# Patient Record
Sex: FEMALE | Race: WHITE | NOT HISPANIC OR LATINO | Employment: OTHER | URBAN - METROPOLITAN AREA
[De-identification: names, ages, dates, MRNs, and addresses within clinical notes are randomized per-mention and may not be internally consistent; named-entity substitution may affect disease eponyms.]

---

## 2021-10-27 ENCOUNTER — LAB REQUISITION (OUTPATIENT)
Dept: LAB | Facility: CLINIC | Age: 86
End: 2021-10-27
Payer: COMMERCIAL

## 2021-10-27 DIAGNOSIS — I10 ESSENTIAL (PRIMARY) HYPERTENSION: ICD-10-CM

## 2021-11-04 LAB
ANION GAP SERPL CALCULATED.3IONS-SCNC: 8 MMOL/L (ref 5–18)
BUN SERPL-MCNC: 13 MG/DL (ref 8–28)
CALCIUM SERPL-MCNC: 8.6 MG/DL (ref 8.5–10.5)
CHLORIDE BLD-SCNC: 109 MMOL/L (ref 98–107)
CO2 SERPL-SCNC: 28 MMOL/L (ref 22–31)
CREAT SERPL-MCNC: 0.64 MG/DL (ref 0.6–1.1)
GFR SERPL CREATININE-BSD FRML MDRD: 80 ML/MIN/1.73M2
GLUCOSE BLD-MCNC: 79 MG/DL (ref 70–125)
POTASSIUM BLD-SCNC: 3.5 MMOL/L (ref 3.5–5)
SODIUM SERPL-SCNC: 145 MMOL/L (ref 136–145)

## 2021-11-04 PROCEDURE — 80048 BASIC METABOLIC PNL TOTAL CA: CPT | Mod: ORL | Performed by: PHYSICIAN ASSISTANT

## 2021-11-04 PROCEDURE — P9604 ONE-WAY ALLOW PRORATED TRIP: HCPCS | Mod: ORL | Performed by: PHYSICIAN ASSISTANT

## 2021-11-04 PROCEDURE — 36415 COLL VENOUS BLD VENIPUNCTURE: CPT | Mod: ORL | Performed by: PHYSICIAN ASSISTANT

## 2021-11-10 ENCOUNTER — LAB REQUISITION (OUTPATIENT)
Dept: LAB | Facility: CLINIC | Age: 86
End: 2021-11-10
Payer: COMMERCIAL

## 2021-11-10 DIAGNOSIS — E87.6 HYPOKALEMIA: ICD-10-CM

## 2021-11-18 LAB — POTASSIUM BLD-SCNC: 4.1 MMOL/L (ref 3.5–5)

## 2021-11-18 PROCEDURE — 84132 ASSAY OF SERUM POTASSIUM: CPT | Mod: ORL | Performed by: PHYSICIAN ASSISTANT

## 2021-11-18 PROCEDURE — 36415 COLL VENOUS BLD VENIPUNCTURE: CPT | Mod: ORL | Performed by: PHYSICIAN ASSISTANT

## 2021-11-18 PROCEDURE — P9603 ONE-WAY ALLOW PRORATED MILES: HCPCS | Mod: ORL | Performed by: PHYSICIAN ASSISTANT

## 2022-01-18 ENCOUNTER — LAB REQUISITION (OUTPATIENT)
Dept: LAB | Facility: CLINIC | Age: 87
End: 2022-01-18
Payer: COMMERCIAL

## 2022-01-18 DIAGNOSIS — I10 ESSENTIAL (PRIMARY) HYPERTENSION: ICD-10-CM

## 2022-01-19 ENCOUNTER — LAB REQUISITION (OUTPATIENT)
Dept: LAB | Facility: CLINIC | Age: 87
End: 2022-01-19

## 2022-01-19 DIAGNOSIS — I10 ESSENTIAL (PRIMARY) HYPERTENSION: ICD-10-CM

## 2022-01-27 LAB
ANION GAP SERPL CALCULATED.3IONS-SCNC: 8 MMOL/L (ref 5–18)
BUN SERPL-MCNC: 18 MG/DL (ref 8–28)
CALCIUM SERPL-MCNC: 9.1 MG/DL (ref 8.5–10.5)
CHLORIDE BLD-SCNC: 108 MMOL/L (ref 98–107)
CO2 SERPL-SCNC: 26 MMOL/L (ref 22–31)
CREAT SERPL-MCNC: 0.71 MG/DL (ref 0.6–1.1)
GFR SERPL CREATININE-BSD FRML MDRD: 81 ML/MIN/1.73M2
GLUCOSE BLD-MCNC: 95 MG/DL (ref 70–125)
POTASSIUM BLD-SCNC: 4.6 MMOL/L (ref 3.5–5)
SODIUM SERPL-SCNC: 142 MMOL/L (ref 136–145)

## 2022-01-27 PROCEDURE — P9604 ONE-WAY ALLOW PRORATED TRIP: HCPCS | Mod: ORL | Performed by: PHYSICIAN ASSISTANT

## 2022-01-27 PROCEDURE — 80048 BASIC METABOLIC PNL TOTAL CA: CPT | Mod: ORL | Performed by: PHYSICIAN ASSISTANT

## 2022-01-27 PROCEDURE — 36415 COLL VENOUS BLD VENIPUNCTURE: CPT | Mod: ORL | Performed by: PHYSICIAN ASSISTANT

## 2022-07-13 ENCOUNTER — LAB REQUISITION (OUTPATIENT)
Dept: LAB | Facility: CLINIC | Age: 87
End: 2022-07-13
Payer: COMMERCIAL

## 2022-07-13 DIAGNOSIS — K59.1 FUNCTIONAL DIARRHEA: ICD-10-CM

## 2022-07-14 LAB
ALBUMIN SERPL BCG-MCNC: 3.9 G/DL (ref 3.5–5.2)
ALP SERPL-CCNC: 69 U/L (ref 35–104)
ALT SERPL W P-5'-P-CCNC: 13 U/L (ref 10–35)
AST SERPL W P-5'-P-CCNC: 25 U/L (ref 10–35)
BILIRUB DIRECT SERPL-MCNC: <0.2 MG/DL (ref 0–0.3)
BILIRUB SERPL-MCNC: 0.3 MG/DL
PROT SERPL-MCNC: 6.8 G/DL (ref 6.4–8.3)
VALPROATE SERPL-MCNC: 8.6 UG/ML

## 2022-07-14 PROCEDURE — 36415 COLL VENOUS BLD VENIPUNCTURE: CPT | Mod: ORL | Performed by: PHYSICIAN ASSISTANT

## 2022-07-14 PROCEDURE — P9604 ONE-WAY ALLOW PRORATED TRIP: HCPCS | Mod: ORL | Performed by: PHYSICIAN ASSISTANT

## 2022-07-14 PROCEDURE — 80164 ASSAY DIPROPYLACETIC ACD TOT: CPT | Mod: ORL | Performed by: PHYSICIAN ASSISTANT

## 2022-07-14 PROCEDURE — 80076 HEPATIC FUNCTION PANEL: CPT | Mod: ORL | Performed by: PHYSICIAN ASSISTANT

## 2022-07-20 ENCOUNTER — LAB REQUISITION (OUTPATIENT)
Dept: LAB | Facility: CLINIC | Age: 87
End: 2022-07-20
Payer: COMMERCIAL

## 2022-07-20 DIAGNOSIS — K59.1 FUNCTIONAL DIARRHEA: ICD-10-CM

## 2022-10-31 ENCOUNTER — LAB REQUISITION (OUTPATIENT)
Dept: LAB | Facility: CLINIC | Age: 87
End: 2022-10-31
Payer: COMMERCIAL

## 2022-10-31 DIAGNOSIS — F41.8 OTHER SPECIFIED ANXIETY DISORDERS: ICD-10-CM

## 2022-11-09 ENCOUNTER — LAB REQUISITION (OUTPATIENT)
Dept: LAB | Facility: CLINIC | Age: 87
End: 2022-11-09
Payer: COMMERCIAL

## 2022-11-09 DIAGNOSIS — F41.8 OTHER SPECIFIED ANXIETY DISORDERS: ICD-10-CM

## 2022-11-10 LAB
ANION GAP SERPL CALCULATED.3IONS-SCNC: 13 MMOL/L (ref 7–15)
BUN SERPL-MCNC: 26.6 MG/DL (ref 8–23)
CALCIUM SERPL-MCNC: 9.8 MG/DL (ref 8.8–10.2)
CHLORIDE SERPL-SCNC: 108 MMOL/L (ref 98–107)
CREAT SERPL-MCNC: 0.74 MG/DL (ref 0.51–0.95)
DEPRECATED HCO3 PLAS-SCNC: 24 MMOL/L (ref 22–29)
GFR SERPL CREATININE-BSD FRML MDRD: 77 ML/MIN/1.73M2
GLUCOSE SERPL-MCNC: 84 MG/DL (ref 70–99)
POTASSIUM SERPL-SCNC: 5 MMOL/L (ref 3.4–5.3)
SODIUM SERPL-SCNC: 145 MMOL/L (ref 136–145)
VALPROATE SERPL-MCNC: 19.7 UG/ML

## 2022-11-10 PROCEDURE — P9603 ONE-WAY ALLOW PRORATED MILES: HCPCS | Mod: ORL | Performed by: PHYSICIAN ASSISTANT

## 2022-11-10 PROCEDURE — 36415 COLL VENOUS BLD VENIPUNCTURE: CPT | Mod: ORL | Performed by: PHYSICIAN ASSISTANT

## 2022-11-10 PROCEDURE — 80164 ASSAY DIPROPYLACETIC ACD TOT: CPT | Mod: ORL | Performed by: PHYSICIAN ASSISTANT

## 2022-11-10 PROCEDURE — 80048 BASIC METABOLIC PNL TOTAL CA: CPT | Mod: ORL | Performed by: PHYSICIAN ASSISTANT

## 2023-01-01 ENCOUNTER — HOSPITAL ENCOUNTER (OUTPATIENT)
Facility: HOSPITAL | Age: 88
Setting detail: OBSERVATION
Discharge: HOME | End: 2023-12-28
Attending: FAMILY MEDICINE | Admitting: INTERNAL MEDICINE
Payer: COMMERCIAL

## 2023-01-01 ENCOUNTER — MEDICAL CORRESPONDENCE (OUTPATIENT)
Dept: HEALTH INFORMATION MANAGEMENT | Facility: CLINIC | Age: 88
End: 2023-01-01
Payer: COMMERCIAL

## 2023-01-01 ENCOUNTER — LAB REQUISITION (OUTPATIENT)
Dept: LAB | Facility: CLINIC | Age: 88
End: 2023-01-01
Payer: COMMERCIAL

## 2023-01-01 ENCOUNTER — APPOINTMENT (OUTPATIENT)
Dept: OCCUPATIONAL THERAPY | Facility: HOSPITAL | Age: 88
End: 2023-01-01
Attending: INTERNAL MEDICINE
Payer: COMMERCIAL

## 2023-01-01 ENCOUNTER — APPOINTMENT (OUTPATIENT)
Dept: CT IMAGING | Facility: HOSPITAL | Age: 88
End: 2023-01-01
Attending: FAMILY MEDICINE
Payer: COMMERCIAL

## 2023-01-01 ENCOUNTER — APPOINTMENT (OUTPATIENT)
Dept: PHYSICAL THERAPY | Facility: HOSPITAL | Age: 88
End: 2023-01-01
Attending: INTERNAL MEDICINE
Payer: COMMERCIAL

## 2023-01-01 VITALS
SYSTOLIC BLOOD PRESSURE: 140 MMHG | HEIGHT: 64 IN | RESPIRATION RATE: 20 BRPM | WEIGHT: 135.14 LBS | HEART RATE: 78 BPM | BODY MASS INDEX: 23.07 KG/M2 | DIASTOLIC BLOOD PRESSURE: 63 MMHG | OXYGEN SATURATION: 97 % | TEMPERATURE: 98.2 F

## 2023-01-01 DIAGNOSIS — I10 ESSENTIAL (PRIMARY) HYPERTENSION: ICD-10-CM

## 2023-01-01 DIAGNOSIS — K21.00 GASTROESOPHAGEAL REFLUX DISEASE WITH ESOPHAGITIS WITHOUT HEMORRHAGE: Primary | ICD-10-CM

## 2023-01-01 DIAGNOSIS — F03.911 DEMENTIA WITH AGITATION, UNSPECIFIED DEMENTIA SEVERITY, UNSPECIFIED DEMENTIA TYPE (H): ICD-10-CM

## 2023-01-01 DIAGNOSIS — G93.40 ACUTE ENCEPHALOPATHY: ICD-10-CM

## 2023-01-01 DIAGNOSIS — F02.80 DEMENTIA IN OTHER DISEASES CLASSIFIED ELSEWHERE, UNSPECIFIED SEVERITY, WITHOUT BEHAVIORAL DISTURBANCE, PSYCHOTIC DISTURBANCE, MOOD DISTURBANCE, AND ANXIETY (H): ICD-10-CM

## 2023-01-01 DIAGNOSIS — K59.01 SLOW TRANSIT CONSTIPATION: ICD-10-CM

## 2023-01-01 DIAGNOSIS — N39.0 ACUTE LOWER UTI: ICD-10-CM

## 2023-01-01 LAB
ALBUMIN SERPL BCG-MCNC: 3.8 G/DL (ref 3.5–5.2)
ALBUMIN UR-MCNC: 20 MG/DL
ALP SERPL-CCNC: 87 U/L (ref 40–150)
ALT SERPL W P-5'-P-CCNC: 21 U/L (ref 0–50)
AMMONIA PLAS-SCNC: 15 UMOL/L (ref 11–51)
ANION GAP SERPL CALCULATED.3IONS-SCNC: 10 MMOL/L (ref 7–15)
ANION GAP SERPL CALCULATED.3IONS-SCNC: 14 MMOL/L (ref 7–15)
APPEARANCE UR: ABNORMAL
AST SERPL W P-5'-P-CCNC: 26 U/L (ref 0–45)
ATRIAL RATE - MUSE: 77 BPM
BACTERIA BLD CULT: NO GROWTH
BACTERIA BLD CULT: NO GROWTH
BACTERIA UR CULT: NO GROWTH
BASE EXCESS BLDV CALC-SCNC: 1.8 MMOL/L
BASOPHILS # BLD AUTO: 0.1 10E3/UL (ref 0–0.2)
BASOPHILS NFR BLD AUTO: 1 %
BILIRUB DIRECT SERPL-MCNC: <0.2 MG/DL (ref 0–0.3)
BILIRUB SERPL-MCNC: 0.3 MG/DL
BILIRUB UR QL STRIP: NEGATIVE
BUN SERPL-MCNC: 10.4 MG/DL (ref 8–23)
BUN SERPL-MCNC: 26.5 MG/DL (ref 8–23)
CALCIUM SERPL-MCNC: 9.1 MG/DL (ref 8.2–9.6)
CALCIUM SERPL-MCNC: 9.2 MG/DL (ref 8.2–9.6)
CHLORIDE SERPL-SCNC: 105 MMOL/L (ref 98–107)
CHLORIDE SERPL-SCNC: 106 MMOL/L (ref 98–107)
COLOR UR AUTO: YELLOW
CREAT SERPL-MCNC: 0.64 MG/DL (ref 0.51–0.95)
CREAT SERPL-MCNC: 0.73 MG/DL (ref 0.51–0.95)
DEPRECATED HCO3 PLAS-SCNC: 23 MMOL/L (ref 22–29)
DEPRECATED HCO3 PLAS-SCNC: 26 MMOL/L (ref 22–29)
DIASTOLIC BLOOD PRESSURE - MUSE: 74 MMHG
DIGOXIN SERPL-MCNC: <0.4 NG/ML (ref 0.6–2)
EGFRCR SERPLBLD CKD-EPI 2021: 78 ML/MIN/1.73M2
EGFRCR SERPLBLD CKD-EPI 2021: 83 ML/MIN/1.73M2
EOSINOPHIL # BLD AUTO: 0.1 10E3/UL (ref 0–0.7)
EOSINOPHIL NFR BLD AUTO: 1 %
ERYTHROCYTE [DISTWIDTH] IN BLOOD BY AUTOMATED COUNT: 12.7 % (ref 10–15)
FLUAV RNA SPEC QL NAA+PROBE: NEGATIVE
FLUBV RNA RESP QL NAA+PROBE: NEGATIVE
GLUCOSE BLDC GLUCOMTR-MCNC: 104 MG/DL (ref 70–99)
GLUCOSE BLDC GLUCOMTR-MCNC: 115 MG/DL (ref 70–99)
GLUCOSE BLDC GLUCOMTR-MCNC: 150 MG/DL (ref 70–99)
GLUCOSE SERPL-MCNC: 110 MG/DL (ref 70–99)
GLUCOSE SERPL-MCNC: 166 MG/DL (ref 70–99)
GLUCOSE UR STRIP-MCNC: NEGATIVE MG/DL
HBA1C MFR BLD: 5.4 %
HCO3 BLDV-SCNC: 27 MMOL/L (ref 24–30)
HCT VFR BLD AUTO: 37.3 % (ref 35–47)
HGB BLD-MCNC: 12.2 G/DL (ref 11.7–15.7)
HGB UR QL STRIP: ABNORMAL
HOLD SPECIMEN: NORMAL
IMM GRANULOCYTES # BLD: 0 10E3/UL
IMM GRANULOCYTES NFR BLD: 1 %
INTERPRETATION ECG - MUSE: NORMAL
KETONES UR STRIP-MCNC: ABNORMAL MG/DL
LACTATE SERPL-SCNC: 1.6 MMOL/L (ref 0.7–2)
LEUKOCYTE ESTERASE UR QL STRIP: ABNORMAL
LYMPHOCYTES # BLD AUTO: 1.2 10E3/UL (ref 0.8–5.3)
LYMPHOCYTES NFR BLD AUTO: 15 %
MAGNESIUM SERPL-MCNC: 2 MG/DL (ref 1.7–2.3)
MCH RBC QN AUTO: 32 PG (ref 26.5–33)
MCHC RBC AUTO-ENTMCNC: 32.7 G/DL (ref 31.5–36.5)
MCV RBC AUTO: 98 FL (ref 78–100)
MONOCYTES # BLD AUTO: 0.5 10E3/UL (ref 0–1.3)
MONOCYTES NFR BLD AUTO: 6 %
MUCOUS THREADS #/AREA URNS LPF: PRESENT /LPF
NEUTROPHILS # BLD AUTO: 6 10E3/UL (ref 1.6–8.3)
NEUTROPHILS NFR BLD AUTO: 76 %
NITRATE UR QL: NEGATIVE
NRBC # BLD AUTO: 0 10E3/UL
NRBC BLD AUTO-RTO: 0 /100
OXYHGB MFR BLDV: 70.2 % (ref 70–75)
P AXIS - MUSE: 63 DEGREES
PCO2 BLDV: 49 MM HG (ref 35–50)
PH BLDV: 7.35 [PH] (ref 7.35–7.45)
PH UR STRIP: 6.5 [PH] (ref 5–7)
PLATELET # BLD AUTO: 242 10E3/UL (ref 150–450)
PO2 BLDV: 42 MM HG (ref 25–47)
POTASSIUM SERPL-SCNC: 3.9 MMOL/L (ref 3.4–5.3)
POTASSIUM SERPL-SCNC: 3.9 MMOL/L (ref 3.4–5.3)
PR INTERVAL - MUSE: 192 MS
PROCALCITONIN SERPL IA-MCNC: 0.12 NG/ML
PROT SERPL-MCNC: 6.3 G/DL (ref 6.4–8.3)
QRS DURATION - MUSE: 80 MS
QT - MUSE: 360 MS
QTC - MUSE: 407 MS
R AXIS - MUSE: 19 DEGREES
RBC # BLD AUTO: 3.81 10E6/UL (ref 3.8–5.2)
RBC URINE: 18 /HPF
RSV RNA SPEC NAA+PROBE: NEGATIVE
SAO2 % BLDV: 71.4 % (ref 70–75)
SARS-COV-2 RNA RESP QL NAA+PROBE: NEGATIVE
SODIUM SERPL-SCNC: 142 MMOL/L (ref 135–145)
SODIUM SERPL-SCNC: 142 MMOL/L (ref 135–145)
SP GR UR STRIP: 1.02 (ref 1–1.03)
SYSTOLIC BLOOD PRESSURE - MUSE: 169 MMHG
T AXIS - MUSE: 11 DEGREES
T4 FREE SERPL-MCNC: 1.16 NG/DL (ref 0.9–1.7)
TSH SERPL DL<=0.005 MIU/L-ACNC: 5.58 UIU/ML (ref 0.3–4.2)
UROBILINOGEN UR STRIP-MCNC: <2 MG/DL
VALPROATE FREE MFR SERPL: ABNORMAL %
VALPROATE FREE SERPL-MCNC: <7 UG/ML
VALPROATE SERPL-MCNC: 23 UG/ML
VENTRICULAR RATE- MUSE: 77 BPM
WBC # BLD AUTO: 7.9 10E3/UL (ref 4–11)
WBC CLUMPS #/AREA URNS HPF: PRESENT /HPF
WBC URINE: >182 /HPF

## 2023-01-01 PROCEDURE — 84443 ASSAY THYROID STIM HORMONE: CPT | Performed by: INTERNAL MEDICINE

## 2023-01-01 PROCEDURE — 84439 ASSAY OF FREE THYROXINE: CPT | Performed by: INTERNAL MEDICINE

## 2023-01-01 PROCEDURE — 96366 THER/PROPH/DIAG IV INF ADDON: CPT

## 2023-01-01 PROCEDURE — 36415 COLL VENOUS BLD VENIPUNCTURE: CPT | Performed by: INTERNAL MEDICINE

## 2023-01-01 PROCEDURE — G0378 HOSPITAL OBSERVATION PER HR: HCPCS

## 2023-01-01 PROCEDURE — 99222 1ST HOSP IP/OBS MODERATE 55: CPT | Performed by: INTERNAL MEDICINE

## 2023-01-01 PROCEDURE — 70450 CT HEAD/BRAIN W/O DYE: CPT

## 2023-01-01 PROCEDURE — 250N000011 HC RX IP 250 OP 636: Performed by: INTERNAL MEDICINE

## 2023-01-01 PROCEDURE — 258N000003 HC RX IP 258 OP 636: Performed by: INTERNAL MEDICINE

## 2023-01-01 PROCEDURE — 83735 ASSAY OF MAGNESIUM: CPT | Performed by: FAMILY MEDICINE

## 2023-01-01 PROCEDURE — 80162 ASSAY OF DIGOXIN TOTAL: CPT | Mod: ORL | Performed by: PHYSICIAN ASSISTANT

## 2023-01-01 PROCEDURE — 99285 EMERGENCY DEPT VISIT HI MDM: CPT | Mod: 25

## 2023-01-01 PROCEDURE — C9113 INJ PANTOPRAZOLE SODIUM, VIA: HCPCS | Performed by: INTERNAL MEDICINE

## 2023-01-01 PROCEDURE — 97162 PT EVAL MOD COMPLEX 30 MIN: CPT | Mod: GP

## 2023-01-01 PROCEDURE — 82962 GLUCOSE BLOOD TEST: CPT

## 2023-01-01 PROCEDURE — 97110 THERAPEUTIC EXERCISES: CPT | Mod: GP

## 2023-01-01 PROCEDURE — 250N000013 HC RX MED GY IP 250 OP 250 PS 637: Performed by: INTERNAL MEDICINE

## 2023-01-01 PROCEDURE — 71260 CT THORAX DX C+: CPT

## 2023-01-01 PROCEDURE — 99239 HOSP IP/OBS DSCHRG MGMT >30: CPT | Performed by: INTERNAL MEDICINE

## 2023-01-01 PROCEDURE — 99232 SBSQ HOSP IP/OBS MODERATE 35: CPT | Performed by: INTERNAL MEDICINE

## 2023-01-01 PROCEDURE — 258N000003 HC RX IP 258 OP 636: Performed by: STUDENT IN AN ORGANIZED HEALTH CARE EDUCATION/TRAINING PROGRAM

## 2023-01-01 PROCEDURE — 36415 COLL VENOUS BLD VENIPUNCTURE: CPT | Performed by: FAMILY MEDICINE

## 2023-01-01 PROCEDURE — P9604 ONE-WAY ALLOW PRORATED TRIP: HCPCS | Mod: ORL | Performed by: PHYSICIAN ASSISTANT

## 2023-01-01 PROCEDURE — 250N000011 HC RX IP 250 OP 636: Performed by: STUDENT IN AN ORGANIZED HEALTH CARE EDUCATION/TRAINING PROGRAM

## 2023-01-01 PROCEDURE — 93005 ELECTROCARDIOGRAM TRACING: CPT | Performed by: FAMILY MEDICINE

## 2023-01-01 PROCEDURE — 80164 ASSAY DIPROPYLACETIC ACD TOT: CPT | Performed by: INTERNAL MEDICINE

## 2023-01-01 PROCEDURE — 96365 THER/PROPH/DIAG IV INF INIT: CPT

## 2023-01-01 PROCEDURE — 250N000011 HC RX IP 250 OP 636: Mod: JZ | Performed by: FAMILY MEDICINE

## 2023-01-01 PROCEDURE — 36415 COLL VENOUS BLD VENIPUNCTURE: CPT | Mod: ORL | Performed by: PHYSICIAN ASSISTANT

## 2023-01-01 PROCEDURE — 80048 BASIC METABOLIC PNL TOTAL CA: CPT | Mod: ORL | Performed by: PHYSICIAN ASSISTANT

## 2023-01-01 PROCEDURE — 82140 ASSAY OF AMMONIA: CPT | Performed by: FAMILY MEDICINE

## 2023-01-01 PROCEDURE — 96361 HYDRATE IV INFUSION ADD-ON: CPT

## 2023-01-01 PROCEDURE — 87086 URINE CULTURE/COLONY COUNT: CPT | Performed by: FAMILY MEDICINE

## 2023-01-01 PROCEDURE — 83036 HEMOGLOBIN GLYCOSYLATED A1C: CPT | Performed by: INTERNAL MEDICINE

## 2023-01-01 PROCEDURE — 97165 OT EVAL LOW COMPLEX 30 MIN: CPT | Mod: GO

## 2023-01-01 PROCEDURE — 87637 SARSCOV2&INF A&B&RSV AMP PRB: CPT | Performed by: INTERNAL MEDICINE

## 2023-01-01 PROCEDURE — 80053 COMPREHEN METABOLIC PANEL: CPT | Performed by: FAMILY MEDICINE

## 2023-01-01 PROCEDURE — 83605 ASSAY OF LACTIC ACID: CPT | Performed by: FAMILY MEDICINE

## 2023-01-01 PROCEDURE — 80165 DIPROPYLACETIC ACID FREE: CPT | Performed by: INTERNAL MEDICINE

## 2023-01-01 PROCEDURE — 87040 BLOOD CULTURE FOR BACTERIA: CPT | Performed by: FAMILY MEDICINE

## 2023-01-01 PROCEDURE — 96376 TX/PRO/DX INJ SAME DRUG ADON: CPT

## 2023-01-01 PROCEDURE — 81001 URINALYSIS AUTO W/SCOPE: CPT | Performed by: FAMILY MEDICINE

## 2023-01-01 PROCEDURE — 84145 PROCALCITONIN (PCT): CPT | Performed by: FAMILY MEDICINE

## 2023-01-01 PROCEDURE — 82805 BLOOD GASES W/O2 SATURATION: CPT | Performed by: FAMILY MEDICINE

## 2023-01-01 PROCEDURE — 96375 TX/PRO/DX INJ NEW DRUG ADDON: CPT

## 2023-01-01 PROCEDURE — 85025 COMPLETE CBC W/AUTO DIFF WBC: CPT | Performed by: FAMILY MEDICINE

## 2023-01-01 PROCEDURE — 250N000011 HC RX IP 250 OP 636: Performed by: FAMILY MEDICINE

## 2023-01-01 RX ORDER — LISINOPRIL 5 MG/1
10 TABLET ORAL 2 TIMES DAILY
Status: DISCONTINUED | OUTPATIENT
Start: 2023-01-01 | End: 2023-01-01 | Stop reason: HOSPADM

## 2023-01-01 RX ORDER — IOPAMIDOL 755 MG/ML
75 INJECTION, SOLUTION INTRAVASCULAR ONCE
Status: COMPLETED | OUTPATIENT
Start: 2023-01-01 | End: 2023-01-01

## 2023-01-01 RX ORDER — DOCUSATE SODIUM 100 MG/1
100 CAPSULE, LIQUID FILLED ORAL 2 TIMES DAILY PRN
Status: DISCONTINUED | OUTPATIENT
Start: 2023-01-01 | End: 2023-01-01 | Stop reason: HOSPADM

## 2023-01-01 RX ORDER — SODIUM CHLORIDE 9 MG/ML
INJECTION, SOLUTION INTRAVENOUS CONTINUOUS
Status: ACTIVE | OUTPATIENT
Start: 2023-01-01 | End: 2023-01-01

## 2023-01-01 RX ORDER — AMOXICILLIN 250 MG
1 CAPSULE ORAL 2 TIMES DAILY PRN
Status: DISCONTINUED | OUTPATIENT
Start: 2023-01-01 | End: 2023-01-01 | Stop reason: HOSPADM

## 2023-01-01 RX ORDER — DIVALPROEX SODIUM 125 MG/1
250 TABLET, DELAYED RELEASE ORAL EVERY EVENING
COMMUNITY
End: 2023-01-01

## 2023-01-01 RX ORDER — CEFTRIAXONE 1 G/1
1 INJECTION, POWDER, FOR SOLUTION INTRAMUSCULAR; INTRAVENOUS EVERY 24 HOURS
Status: DISCONTINUED | OUTPATIENT
Start: 2023-01-01 | End: 2023-01-01

## 2023-01-01 RX ORDER — CEFTRIAXONE 1 G/1
1 INJECTION, POWDER, FOR SOLUTION INTRAMUSCULAR; INTRAVENOUS ONCE
Status: COMPLETED | OUTPATIENT
Start: 2023-01-01 | End: 2023-01-01

## 2023-01-01 RX ORDER — ACETAMINOPHEN 500 MG
1000 TABLET ORAL DAILY PRN
Status: DISCONTINUED | OUTPATIENT
Start: 2023-01-01 | End: 2023-01-01

## 2023-01-01 RX ORDER — POLYETHYLENE GLYCOL 3350 17 G/17G
17 POWDER, FOR SOLUTION ORAL DAILY PRN
Status: DISCONTINUED | OUTPATIENT
Start: 2023-01-01 | End: 2023-01-01 | Stop reason: HOSPADM

## 2023-01-01 RX ORDER — POLYETHYLENE GLYCOL 3350 17 G/17G
17 POWDER, FOR SOLUTION ORAL DAILY PRN
DISCHARGE
Start: 2023-01-01

## 2023-01-01 RX ORDER — ONDANSETRON 2 MG/ML
4 INJECTION INTRAMUSCULAR; INTRAVENOUS
Status: COMPLETED | OUTPATIENT
Start: 2023-01-01 | End: 2023-01-01

## 2023-01-01 RX ORDER — ACETAMINOPHEN 325 MG/1
650 TABLET ORAL EVERY 4 HOURS PRN
Status: DISCONTINUED | OUTPATIENT
Start: 2023-01-01 | End: 2023-01-01 | Stop reason: HOSPADM

## 2023-01-01 RX ORDER — ACETAMINOPHEN 500 MG
1000 TABLET ORAL DAILY PRN
COMMUNITY

## 2023-01-01 RX ORDER — ACETAMINOPHEN 500 MG
1000 TABLET ORAL 2 TIMES DAILY
COMMUNITY

## 2023-01-01 RX ORDER — MIRTAZAPINE 15 MG/1
15 TABLET, FILM COATED ORAL AT BEDTIME
COMMUNITY

## 2023-01-01 RX ORDER — ACETAMINOPHEN 650 MG/1
650 SUPPOSITORY RECTAL EVERY 4 HOURS PRN
Status: DISCONTINUED | OUTPATIENT
Start: 2023-01-01 | End: 2023-01-01 | Stop reason: HOSPADM

## 2023-01-01 RX ORDER — ASPIRIN 81 MG/1
81 TABLET, CHEWABLE ORAL DAILY
COMMUNITY

## 2023-01-01 RX ORDER — DIVALPROEX SODIUM 125 MG/1
250 CAPSULE, COATED PELLETS ORAL EVERY EVENING
COMMUNITY

## 2023-01-01 RX ORDER — ONDANSETRON 4 MG/1
4 TABLET, ORALLY DISINTEGRATING ORAL EVERY 6 HOURS PRN
Status: DISCONTINUED | OUTPATIENT
Start: 2023-01-01 | End: 2023-01-01 | Stop reason: HOSPADM

## 2023-01-01 RX ORDER — POLYETHYLENE GLYCOL 3350 17 G/17G
17 POWDER, FOR SOLUTION ORAL DAILY
Status: DISCONTINUED | OUTPATIENT
Start: 2023-01-01 | End: 2023-01-01

## 2023-01-01 RX ORDER — DIVALPROEX SODIUM 125 MG/1
125 CAPSULE, COATED PELLETS ORAL EVERY MORNING
Status: DISCONTINUED | OUTPATIENT
Start: 2023-01-01 | End: 2023-01-01 | Stop reason: HOSPADM

## 2023-01-01 RX ORDER — MIRTAZAPINE 15 MG/1
15 TABLET, FILM COATED ORAL AT BEDTIME
Status: DISCONTINUED | OUTPATIENT
Start: 2023-01-01 | End: 2023-01-01 | Stop reason: HOSPADM

## 2023-01-01 RX ORDER — DOCUSATE SODIUM 100 MG/1
100 CAPSULE, LIQUID FILLED ORAL 2 TIMES DAILY PRN
COMMUNITY

## 2023-01-01 RX ORDER — ONDANSETRON 2 MG/ML
4 INJECTION INTRAMUSCULAR; INTRAVENOUS EVERY 6 HOURS PRN
Status: DISCONTINUED | OUTPATIENT
Start: 2023-01-01 | End: 2023-01-01 | Stop reason: HOSPADM

## 2023-01-01 RX ORDER — AMOXICILLIN 250 MG
2 CAPSULE ORAL 2 TIMES DAILY PRN
Status: DISCONTINUED | OUTPATIENT
Start: 2023-01-01 | End: 2023-01-01 | Stop reason: HOSPADM

## 2023-01-01 RX ORDER — ASPIRIN 81 MG/1
81 TABLET, CHEWABLE ORAL DAILY
Status: DISCONTINUED | OUTPATIENT
Start: 2023-01-01 | End: 2023-01-01 | Stop reason: HOSPADM

## 2023-01-01 RX ORDER — DIVALPROEX SODIUM 125 MG/1
250 CAPSULE, COATED PELLETS ORAL EVERY EVENING
Status: DISCONTINUED | OUTPATIENT
Start: 2023-01-01 | End: 2023-01-01 | Stop reason: HOSPADM

## 2023-01-01 RX ORDER — DIVALPROEX SODIUM 125 MG/1
125 CAPSULE, COATED PELLETS ORAL EVERY MORNING
COMMUNITY

## 2023-01-01 RX ORDER — LISINOPRIL 10 MG/1
10 TABLET ORAL 2 TIMES DAILY
COMMUNITY

## 2023-01-01 RX ORDER — ACETAMINOPHEN 500 MG
1000 TABLET ORAL 2 TIMES DAILY
Status: DISCONTINUED | OUTPATIENT
Start: 2023-01-01 | End: 2023-01-01 | Stop reason: HOSPADM

## 2023-01-01 RX ORDER — OMEPRAZOLE 20 MG/1
20 TABLET, DELAYED RELEASE ORAL DAILY
COMMUNITY
Start: 2023-01-01

## 2023-01-01 RX ORDER — POLYETHYLENE GLYCOL 3350 17 G/17G
1 POWDER, FOR SOLUTION ORAL DAILY
Status: ON HOLD | COMMUNITY
End: 2023-01-01

## 2023-01-01 RX ADMIN — LISINOPRIL 10 MG: 5 TABLET ORAL at 08:08

## 2023-01-01 RX ADMIN — DIVALPROEX SODIUM 125 MG: 125 CAPSULE, COATED PELLETS ORAL at 10:26

## 2023-01-01 RX ADMIN — ASPIRIN 81 MG: 81 TABLET, CHEWABLE ORAL at 10:27

## 2023-01-01 RX ADMIN — CEFTRIAXONE SODIUM 1 G: 1 INJECTION, POWDER, FOR SOLUTION INTRAMUSCULAR; INTRAVENOUS at 14:42

## 2023-01-01 RX ADMIN — SODIUM CHLORIDE: 9 INJECTION, SOLUTION INTRAVENOUS at 17:04

## 2023-01-01 RX ADMIN — PANTOPRAZOLE SODIUM 40 MG: 40 INJECTION, POWDER, FOR SOLUTION INTRAVENOUS at 20:04

## 2023-01-01 RX ADMIN — PANTOPRAZOLE SODIUM 40 MG: 40 INJECTION, POWDER, FOR SOLUTION INTRAVENOUS at 20:53

## 2023-01-01 RX ADMIN — IOPAMIDOL 75 ML: 755 INJECTION, SOLUTION INTRAVENOUS at 14:24

## 2023-01-01 RX ADMIN — DIVALPROEX SODIUM 125 MG: 125 CAPSULE, COATED PELLETS ORAL at 08:04

## 2023-01-01 RX ADMIN — ONDANSETRON 4 MG: 2 INJECTION INTRAMUSCULAR; INTRAVENOUS at 12:32

## 2023-01-01 RX ADMIN — ACETAMINOPHEN 1000 MG: 500 TABLET ORAL at 10:22

## 2023-01-01 RX ADMIN — ACETAMINOPHEN 1000 MG: 500 TABLET ORAL at 08:04

## 2023-01-01 RX ADMIN — DIVALPROEX SODIUM 250 MG: 125 CAPSULE, COATED PELLETS ORAL at 20:46

## 2023-01-01 RX ADMIN — LISINOPRIL 10 MG: 5 TABLET ORAL at 20:46

## 2023-01-01 RX ADMIN — SODIUM CHLORIDE 500 ML: 9 INJECTION, SOLUTION INTRAVENOUS at 12:06

## 2023-01-01 RX ADMIN — ASPIRIN 81 MG: 81 TABLET, CHEWABLE ORAL at 08:05

## 2023-01-01 RX ADMIN — MIRTAZAPINE 15 MG: 15 TABLET, FILM COATED ORAL at 22:03

## 2023-01-01 RX ADMIN — CEFTRIAXONE SODIUM 1 G: 1 INJECTION, POWDER, FOR SOLUTION INTRAMUSCULAR; INTRAVENOUS at 13:41

## 2023-01-01 RX ADMIN — ACETAMINOPHEN 1000 MG: 500 TABLET ORAL at 20:46

## 2023-01-01 RX ADMIN — LISINOPRIL 10 MG: 5 TABLET ORAL at 10:27

## 2023-01-01 RX ADMIN — PANTOPRAZOLE SODIUM 40 MG: 40 INJECTION, POWDER, FOR SOLUTION INTRAVENOUS at 08:05

## 2023-01-01 ASSESSMENT — ACTIVITIES OF DAILY LIVING (ADL)
ADLS_ACUITY_SCORE: 51
ADLS_ACUITY_SCORE: 37
ADLS_ACUITY_SCORE: 37
ADLS_ACUITY_SCORE: 35
ADLS_ACUITY_SCORE: 47
ADLS_ACUITY_SCORE: 51
ADLS_ACUITY_SCORE: 55
ADLS_ACUITY_SCORE: 47
ADLS_ACUITY_SCORE: 51
DEPENDENT_IADLS:: CLEANING;COOKING;LAUNDRY;SHOPPING;MEAL PREPARATION;MEDICATION MANAGEMENT;MONEY MANAGEMENT;TRANSPORTATION;INCONTINENCE
ADLS_ACUITY_SCORE: 55
ADLS_ACUITY_SCORE: 47
ADLS_ACUITY_SCORE: 35
ADLS_ACUITY_SCORE: 37
ADLS_ACUITY_SCORE: 47
ADLS_ACUITY_SCORE: 51
ADLS_ACUITY_SCORE: 57
ADLS_ACUITY_SCORE: 51
ADLS_ACUITY_SCORE: 35
ADLS_ACUITY_SCORE: 47
ADLS_ACUITY_SCORE: 60
ADLS_ACUITY_SCORE: 51

## 2023-01-01 ASSESSMENT — COLUMBIA-SUICIDE SEVERITY RATING SCALE - C-SSRS
1. IN THE PAST MONTH, HAVE YOU WISHED YOU WERE DEAD OR WISHED YOU COULD GO TO SLEEP AND NOT WAKE UP?: NO
6. HAVE YOU EVER DONE ANYTHING, STARTED TO DO ANYTHING, OR PREPARED TO DO ANYTHING TO END YOUR LIFE?: NO
2. HAVE YOU ACTUALLY HAD ANY THOUGHTS OF KILLING YOURSELF IN THE PAST MONTH?: NO

## 2023-02-07 ENCOUNTER — LAB REQUISITION (OUTPATIENT)
Dept: LAB | Facility: CLINIC | Age: 88
End: 2023-02-07
Payer: COMMERCIAL

## 2023-02-07 DIAGNOSIS — F02.80 DEMENTIA IN OTHER DISEASES CLASSIFIED ELSEWHERE, UNSPECIFIED SEVERITY, WITHOUT BEHAVIORAL DISTURBANCE, PSYCHOTIC DISTURBANCE, MOOD DISTURBANCE, AND ANXIETY (H): ICD-10-CM

## 2023-02-09 LAB
ALBUMIN SERPL BCG-MCNC: 4.1 G/DL (ref 3.5–5.2)
ALP SERPL-CCNC: 73 U/L (ref 35–104)
ALT SERPL W P-5'-P-CCNC: 9 U/L (ref 10–35)
AST SERPL W P-5'-P-CCNC: 26 U/L (ref 10–35)
BILIRUB DIRECT SERPL-MCNC: <0.2 MG/DL (ref 0–0.3)
BILIRUB SERPL-MCNC: 0.3 MG/DL
PROT SERPL-MCNC: 6.8 G/DL (ref 6.4–8.3)

## 2023-02-09 PROCEDURE — 36415 COLL VENOUS BLD VENIPUNCTURE: CPT | Mod: ORL | Performed by: PHYSICIAN ASSISTANT

## 2023-02-09 PROCEDURE — P9604 ONE-WAY ALLOW PRORATED TRIP: HCPCS | Mod: ORL | Performed by: PHYSICIAN ASSISTANT

## 2023-02-09 PROCEDURE — 80076 HEPATIC FUNCTION PANEL: CPT | Mod: ORL | Performed by: PHYSICIAN ASSISTANT

## 2023-09-01 ENCOUNTER — APPOINTMENT (OUTPATIENT)
Dept: CT IMAGING | Facility: HOSPITAL | Age: 88
End: 2023-09-01
Attending: EMERGENCY MEDICINE
Payer: COMMERCIAL

## 2023-09-01 ENCOUNTER — HOSPITAL ENCOUNTER (EMERGENCY)
Facility: HOSPITAL | Age: 88
Discharge: HOME | End: 2023-09-01
Attending: EMERGENCY MEDICINE | Admitting: EMERGENCY MEDICINE
Payer: COMMERCIAL

## 2023-09-01 VITALS
TEMPERATURE: 97.7 F | RESPIRATION RATE: 18 BRPM | OXYGEN SATURATION: 94 % | SYSTOLIC BLOOD PRESSURE: 153 MMHG | DIASTOLIC BLOOD PRESSURE: 72 MMHG | HEART RATE: 79 BPM
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
## 2023-09-01 PROCEDURE — 99284 EMERGENCY DEPT VISIT MOD MDM: CPT | Mod: 25

## 2023-09-01 PROCEDURE — G1010 CDSM STANSON: HCPCS

## 2023-09-01 ASSESSMENT — ACTIVITIES OF DAILY LIVING (ADL)
ADLS_ACUITY_SCORE: 35
ADLS_ACUITY_SCORE: 35
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
## 2023-09-01 NOTE — DISCHARGE INSTRUCTIONS
No injuries were discovered and we did do a CT scan of the head and cervical spine which are negative.  All blood pressures have been normal here and patient is otherwise asymptomatic.
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
## 2023-09-01 NOTE — ED NOTES
Bed: JNED-24  Expected date: 9/1/23  Expected time: 1:07 PM  Means of arrival:   Comments:  Fall/[facility]   No

## 2023-12-26 PROBLEM — N39.0 ACUTE LOWER UTI: Status: ACTIVE | Noted: 2023-01-01

## 2023-12-26 PROBLEM — F03.911 DEMENTIA WITH AGITATION, UNSPECIFIED DEMENTIA SEVERITY, UNSPECIFIED DEMENTIA TYPE (H): Status: ACTIVE | Noted: 2023-01-01

## 2023-12-26 NOTE — ED PROVIDER NOTES
EMERGENCY DEPARTMENT ENCOUNTER      NAME: Risa Mejía  AGE: 89 year old female  YOB: 1933  MRN: [id number]  EVALUATION DATE & TIME: 12/26/2023 11:27 AM    PCP: Nimo Terry    ED PROVIDER: Kwadwo Carpenter M.D.    Chief Complaint   Patient presents with    Altered Mental Status       FINAL IMPRESSION:  1. Acute lower UTI    2. Dementia with agitation, unspecified dementia severity, unspecified dementia type (H)    3. Acute encephalopathy        ED COURSE & MEDICAL DECISION MAKING:    Pertinent Labs & Imaging studies independently interpreted by me. (See chart for details)  12:05 PM Patient seen and examined, reviewed most recent urgency department evaluation September 2023 when patient was seen with agitation and confusion.  On exam here, patient is sleeping, does not open eyes to voice, does withdraw to pain.  No respiratory distress, no hypoxia, no labored breathing.  Patient is noted to be hypothermic on initial exam, labs are ordered along with CT scan for a broad differential for altered mental status in this 89-year-old with dementia.  2:04 PM labs ordered and independently interpreted by me with normal basic metabolic panel, normal hepatic panel, negative lactate, normal procalcitonin, normal white blood cell count, normal venous blood gas.  Urinalysis is consistent with infection which may be causing symptoms today.  No evidence for sepsis.  No prior urine culture in our system, Rocephin IV is ordered.  2:39 PM CT scan of the head independently interpreted by me negative for acute findings.  CT scan of the chest independently interpreted by me negative for acute infiltrate. Given vomiting and altered mentation, patient is at risk for aspiration and this should be monitored closely  2:42 PM Care discussed with Dr. Dubon for admission.    At the conclusion of the encounter I discussed the results of all of the tests and the disposition. The questions were answered. The patient or  family acknowledged understanding and was agreeable with the care plan.     Medical Decision Making    History:  Supplemental history from: EMS and Other: nurse  External Record(s) reviewed: Documented in chart, if applicable.    Work Up:  Chart documentation includes differential considered and any EKGs or imaging independently interpreted by provider, where specified.  In additional to work up documented, I considered the following work up: Documented in chart, if applicable.    External consultation:  Discussion of management with another provider: Documented in chart, if applicable    Complicating factors:  Care impacted by chronic illness: Dementia  Care affected by social determinants of health: N/A    Disposition considerations: Admit.    EKG:    Performed at: 12:11 PM  Impression: Nonspecific ST changes, occasional PVC  Rate: 78  Rhythm: Sinus  Axis: Normal  NJ Interval: 206  QRS Interval: 88  QTc Interval: 458  ST Changes: No acute ischemic changes  Comparison: None    I have independently reviewed and interpreted the EKG(s) documented above.    PROCEDURES:       MEDICATIONS GIVEN IN THE EMERGENCY:  Medications   cefTRIAXone (ROCEPHIN) 1 g vial to attach to  mL bag for ADULTS or NS 50 mL bag for PEDS (1 g Intravenous $New Bag 12/26/23 4200)   senna-docusate (SENOKOT-S/PERICOLACE) 8.6-50 MG per tablet 1 tablet (has no administration in time range)     Or   senna-docusate (SENOKOT-S/PERICOLACE) 8.6-50 MG per tablet 2 tablet (has no administration in time range)   ondansetron (ZOFRAN ODT) ODT tab 4 mg (has no administration in time range)     Or   ondansetron (ZOFRAN) injection 4 mg (has no administration in time range)   acetaminophen (TYLENOL) tablet 650 mg (has no administration in time range)     Or   acetaminophen (TYLENOL) Suppository 650 mg (has no administration in time range)   pantoprazole (PROTONIX) IV push injection 40 mg (has no administration in time range)   ondansetron (ZOFRAN) injection  4 mg (4 mg Intravenous $Given 12/26/23 1232)   sodium chloride 0.9% BOLUS 500 mL (0 mLs Intravenous Stopped 12/26/23 1446)   iopamidol (ISOVUE-370) solution 75 mL (75 mLs Intravenous $Given 12/26/23 1424)       NEW PRESCRIPTIONS STARTED AT TODAY'S ER VISIT  New Prescriptions    No medications on file       =================================================================    HPI    Patient information was obtained from: EMS, nurse      Risa Mejía is a 89 year old female with a pertinent history of dementia who presents to this ED by EMS from [facility] for evaluation of AMS.  Per report, patient usually is ambulatory, confused at baseline but decreased responsiveness today.  Per nurse, large brown emesis and large bowel movement on arrival.      REVIEW OF SYSTEMS   Review of Systems   All other systems reviewed and negative    PAST MEDICAL HISTORY:  No past medical history on file.    PAST SURGICAL HISTORY:  No past surgical history on file.    CURRENT MEDICATIONS:    Current Facility-Administered Medications   Medication    acetaminophen (TYLENOL) tablet 650 mg    Or    acetaminophen (TYLENOL) Suppository 650 mg    cefTRIAXone (ROCEPHIN) 1 g vial to attach to  mL bag for ADULTS or NS 50 mL bag for PEDS    ondansetron (ZOFRAN ODT) ODT tab 4 mg    Or    ondansetron (ZOFRAN) injection 4 mg    pantoprazole (PROTONIX) IV push injection 40 mg    senna-docusate (SENOKOT-S/PERICOLACE) 8.6-50 MG per tablet 1 tablet    Or    senna-docusate (SENOKOT-S/PERICOLACE) 8.6-50 MG per tablet 2 tablet     Current Outpatient Medications   Medication    acetaminophen (TYLENOL) 500 MG tablet    acetaminophen (TYLENOL) 500 MG tablet    aspirin (ASA) 81 MG chewable tablet    divalproex sodium delayed-release (DEPAKOTE SPRINKLE) 125 MG DR capsule    divalproex sodium delayed-release (DEPAKOTE SPRINKLE) 125 MG DR capsule    docusate sodium (COLACE) 100 MG capsule    lisinopril (ZESTRIL) 10 MG tablet    mirtazapine (REMERON) 15  MG tablet    polyethylene glycol (MIRALAX) 17 g packet       ALLERGIES:  Allergies   Allergen Reactions    Oxycodone Muscle Pain (Myalgia)       FAMILY HISTORY:  No family history on file.    SOCIAL HISTORY:   Social History     Socioeconomic History    Marital status:        VITALS:  /60   Pulse 77   Temp 97.5  F (36.4  C) (Rectal)   Resp 25   SpO2 94%     PHYSICAL EXAM:  Physical Exam  Vitals and nursing note reviewed.   Constitutional:       Appearance: Normal appearance.   HENT:      Head: Normocephalic and atraumatic.      Right Ear: External ear normal.      Left Ear: External ear normal.      Nose: Nose normal.      Mouth/Throat:      Mouth: Mucous membranes are moist.   Eyes:      Extraocular Movements: Extraocular movements intact.      Conjunctiva/sclera: Conjunctivae normal.      Pupils: Pupils are equal, round, and reactive to light.   Cardiovascular:      Rate and Rhythm: Normal rate and regular rhythm.   Pulmonary:      Effort: Pulmonary effort is normal.      Breath sounds: Normal breath sounds. No wheezing or rales.   Abdominal:      General: Abdomen is flat. There is no distension.      Palpations: Abdomen is soft.      Tenderness: There is no abdominal tenderness. There is no guarding.   Musculoskeletal:         General: Normal range of motion.      Cervical back: Normal range of motion and neck supple.      Right lower leg: No edema.      Left lower leg: No edema.   Lymphadenopathy:      Cervical: No cervical adenopathy.   Skin:     General: Skin is warm and dry.   Neurological:      General: No focal deficit present.      Mental Status: She is alert.      Comments: No gross focal neurologic deficits.  Does not follow commands, does not open eyes to voice.   Psychiatric:         Mood and Affect: Mood normal.         Behavior: Behavior normal.         Thought Content: Thought content normal.          LAB:  All pertinent labs reviewed and interpreted.  Results for orders placed or  performed during the hospital encounter of 12/26/23   Head CT w/o contrast    Impression    IMPRESSION:  1.  No acute findings. Chronic changes are stable.   CT Chest/Abdomen/Pelvis w Contrast    Impression    IMPRESSION:  1.  Bilateral lower lobar predominant bronchiolitis. Associated endobronchial debris may reflect inflammatory debris or aspiration. No focal pneumonic consolidation or pleural effusion.  2.  Mildly dilated fluid-filled lower thoracic esophagus with long segment circumferential wall thickening which is favored to reflect severe esophagitis rather than a neoplastic process. If the patient is not a candidate for endoscopy, recommend at   least short interval chest CT and/or esophagram follow-up.  3.  No acute findings the abdomen or pelvis. No inflammatory process, bowel obstruction, hydronephrosis or abscess.   Glucose by meter   Result Value Ref Range    GLUCOSE BY METER POCT 150 (H) 70 - 99 mg/dL   Extra Blue Top Tube   Result Value Ref Range    Hold Specimen JIC    Extra Red Top Tube   Result Value Ref Range    Hold Specimen JIC    Extra Green Top (Lithium Heparin) Tube   Result Value Ref Range    Hold Specimen JIC    Basic metabolic panel   Result Value Ref Range    Sodium 142 135 - 145 mmol/L    Potassium 3.9 3.4 - 5.3 mmol/L    Chloride 105 98 - 107 mmol/L    Carbon Dioxide (CO2) 23 22 - 29 mmol/L    Anion Gap 14 7 - 15 mmol/L    Urea Nitrogen 26.5 (H) 8.0 - 23.0 mg/dL    Creatinine 0.73 0.51 - 0.95 mg/dL    GFR Estimate 78 >60 mL/min/1.73m2    Calcium 9.2 8.2 - 9.6 mg/dL    Glucose 166 (H) 70 - 99 mg/dL   Hepatic function panel   Result Value Ref Range    Protein Total 6.3 (L) 6.4 - 8.3 g/dL    Albumin 3.8 3.5 - 5.2 g/dL    Bilirubin Total 0.3 <=1.2 mg/dL    Alkaline Phosphatase 87 40 - 150 U/L    AST 26 0 - 45 U/L    ALT 21 0 - 50 U/L    Bilirubin Direct <0.20 0.00 - 0.30 mg/dL   Lactic acid whole blood   Result Value Ref Range    Lactic Acid 1.6 0.7 - 2.0 mmol/L   Result Value Ref Range     Procalcitonin 0.12 <0.50 ng/mL   Result Value Ref Range    Magnesium 2.0 1.7 - 2.3 mg/dL   Result Value Ref Range    Ammonia 15 11 - 51 umol/L   Blood gas venous   Result Value Ref Range    pH Venous 7.35 7.35 - 7.45    pCO2 Venous 49 35 - 50 mm Hg    pO2 Venous 42 25 - 47 mm Hg    Bicarbonate Venous 27 24 - 30 mmol/L    Base Excess/Deficit 1.8   mmol/L    Oxyhemoglobin Venous 70.2 70.0 - 75.0 %    O2 Sat, Venous 71.4 70.0 - 75.0 %   UA with Microscopic reflex to Culture    Specimen: Urine, Catheter   Result Value Ref Range    Color Urine Yellow Colorless, Straw, Light Yellow, Yellow    Appearance Urine Cloudy (A) Clear    Glucose Urine Negative Negative mg/dL    Bilirubin Urine Negative Negative    Ketones Urine Trace (A) Negative mg/dL    Specific Gravity Urine 1.016 1.001 - 1.030    Blood Urine 0.03 mg/dL (A) Negative    pH Urine 6.5 5.0 - 7.0    Protein Albumin Urine 20 (A) Negative mg/dL    Urobilinogen Urine <2.0 <2.0 mg/dL    Nitrite Urine Negative Negative    Leukocyte Esterase Urine 500 Tom/uL (A) Negative    WBC Clumps Urine Present (A) None Seen /HPF    Mucus Urine Present (A) None Seen /LPF    RBC Urine 18 (H) <=2 /HPF    WBC Urine >182 (H) <=5 /HPF   CBC with platelets and differential   Result Value Ref Range    WBC Count 7.9 4.0 - 11.0 10e3/uL    RBC Count 3.81 3.80 - 5.20 10e6/uL    Hemoglobin 12.2 11.7 - 15.7 g/dL    Hematocrit 37.3 35.0 - 47.0 %    MCV 98 78 - 100 fL    MCH 32.0 26.5 - 33.0 pg    MCHC 32.7 31.5 - 36.5 g/dL    RDW 12.7 10.0 - 15.0 %    Platelet Count 242 150 - 450 10e3/uL    % Neutrophils 76 %    % Lymphocytes 15 %    % Monocytes 6 %    % Eosinophils 1 %    % Basophils 1 %    % Immature Granulocytes 1 %    NRBCs per 100 WBC 0 <1 /100    Absolute Neutrophils 6.0 1.6 - 8.3 10e3/uL    Absolute Lymphocytes 1.2 0.8 - 5.3 10e3/uL    Absolute Monocytes 0.5 0.0 - 1.3 10e3/uL    Absolute Eosinophils 0.1 0.0 - 0.7 10e3/uL    Absolute Basophils 0.1 0.0 - 0.2 10e3/uL    Absolute Immature  Granulocytes 0.0 <=0.4 10e3/uL    Absolute NRBCs 0.0 10e3/uL       RADIOLOGY:  Reviewed all pertinent imaging. Please see official radiology report.  CT Chest/Abdomen/Pelvis w Contrast   Final Result   IMPRESSION:   1.  Bilateral lower lobar predominant bronchiolitis. Associated endobronchial debris may reflect inflammatory debris or aspiration. No focal pneumonic consolidation or pleural effusion.   2.  Mildly dilated fluid-filled lower thoracic esophagus with long segment circumferential wall thickening which is favored to reflect severe esophagitis rather than a neoplastic process. If the patient is not a candidate for endoscopy, recommend at    least short interval chest CT and/or esophagram follow-up.   3.  No acute findings the abdomen or pelvis. No inflammatory process, bowel obstruction, hydronephrosis or abscess.      Head CT w/o contrast   Final Result   IMPRESSION:   1.  No acute findings. Chronic changes are stable.        Kwadwo Carpenter M.D.  Emergency Medicine  [facility] EMERGENCY DEPARTMENT  [facility] [facility] [id number] [phone]  Dept: [phone]       Kwadwo Carpenter MD  12/26/23 8804

## 2023-12-26 NOTE — ED NOTES
Bed: JNED-27  Expected date: 12/26/23  Expected time: 11:15 AM  Means of arrival: Ambulance  Comments:  Jenna 89 yo F less responsive

## 2023-12-26 NOTE — ED TRIAGE NOTES
Pt brought in [facility] ems from [facility], pt was a therapy when staff noticed she was less responsive. Per ems got combative en-route during oxygen check and mentioned she needed to throw up and vomited. Upn arrival, pt responds to sternal rub, and moves all extremities. Pt communicates and walkers with walker at baseline.     Triage Assessment (Adult)       Row Name 12/26/23 1134          Triage Assessment    Airway WDL WDL        Respiratory WDL    Respiratory WDL WDL        Skin Circulation/Temperature WDL    Skin Circulation/Temperature WDL WDL        Cardiac WDL    Cardiac WDL WDL        Cognitive/Neuro/Behavioral WDL    Cognitive/Neuro/Behavioral WDL level of consciousness;orientation     Level of Consciousness somnolent     Orientation --  unable to assess

## 2023-12-26 NOTE — PHARMACY-ADMISSION MEDICATION HISTORY
Pharmacist Admission Medication History    Admission medication history is complete. The information provided in this note is only as accurate as the sources available at the time of the update.    Information Source(s): Facility ([facility] medication list/MAR via N/A    Pertinent Information: none    Changes made to PTA medication list:  Added: all medications were added to the list as it had never been reviewed   Deleted: None  Changed: None    Medication Affordability:       Allergies reviewed with patient and updates made in EHR: yes    Medication History Completed By: Austin Chou [facility] 12/26/2023 3:04 PM    Prior to Admission medications    Medication Sig Last Dose Taking? Auth Provider Long Term End Date   acetaminophen (TYLENOL) 500 MG tablet Take 1,000 mg by mouth 2 times daily 12/26/2023 at 0800 Yes Unknown, Entered By History     acetaminophen (TYLENOL) 500 MG tablet Take 1,000 mg by mouth daily as needed for mild pain  Yes Unknown, Entered By History     aspirin (ASA) 81 MG chewable tablet Take 81 mg by mouth daily 12/26/2023 at 0800 Yes Unknown, Entered By History     divalproex sodium delayed-release (DEPAKOTE SPRINKLE) 125 MG DR capsule Take 125 mg by mouth every morning 12/26/2023 at 0800 Yes Unknown, Entered By History Yes    divalproex sodium delayed-release (DEPAKOTE SPRINKLE) 125 MG DR capsule Take 250 mg by mouth every evening 12/25/2023 at 2000 Yes Unknown, Entered By History Yes    docusate sodium (COLACE) 100 MG capsule Take 100 mg by mouth 2 times daily as needed for constipation  Yes Unknown, Entered By History     lisinopril (ZESTRIL) 10 MG tablet Take 10 mg by mouth 2 times daily 12/26/2023 at 0800 Yes Unknown, Entered By History Yes    mirtazapine (REMERON) 15 MG tablet Take 15 mg by mouth at bedtime 12/25/2023 at 2000 Yes Unknown, Entered By History Yes    polyethylene glycol (MIRALAX) 17 g packet Take 1 packet by mouth daily 12/26/2023 at 0800 Yes Unknown, Entered By History

## 2023-12-26 NOTE — H&P
[facility]    History and Physical - Hospitalist Service       Date of Admission:  12/26/2023    Assessment & Plan      Risa Mejía is a 89 year old female admitted on 12/26/2023. She has advanced dementia, HTN, living in memory care. Presenting with decreased responsiveness. Vomited en route.    Abnormal UA, possible UTI/acute cystitis  -early sepsis with hypothermia  -iv Rocephin  -bear hug  -ivf  -await uc    Vomiting  Esophagitis  -abd ct: possible esophagitis  -ppi  -Zofran prn    Acute metabolic encephalopathy in addition to dementis  -due to UTI?  -close monitor; might start Zyprexa if agitation     Advanced dementia  -called  and son, who cannot communicate much with pt at [facility]  -PTA meds    HTN  -PTA meds    Pt is on Depakote, but family don't know what is for   -resume home PTA meds and try to get record from pcp     Observation Goals: -diagnostic tests and consults completed and resulted, -vital signs normal or at patient baseline, -tolerating oral intake to maintain hydration, -tolerating oral antibiotics or has plans for home infusion setup, -infection is improving, -returns to baseline functional status, -safe disposition plan has been identified, Nurse to notify provider when observation goals have been met and patient is ready for discharge.  Diet: Combination Diet Full Liquid    DVT Prophylaxis: Pneumatic Compression Devices  Santana Catheter: Not present  Lines: None     Cardiac Monitoring: None  Code Status: Full Code  confirmed with     Clinically Significant Risk Factors Present on Admission                # Drug Induced Platelet Defect: home medication list includes an antiplatelet medication   # Hypertension: Home medication list includes antihypertensive(s)   # Dementia: noted on problem list               Disposition Plan      Expected Discharge Date: 12/27/2023                  Camron Dubon MD  Hospitalist Service  [facility]  [facility]  Securely message with [employer] (more info)  Text page via [facility] Paging/Directory     ______________________________________________________________________    Chief Complaint   Decreased responsiveness, vomiting    History is obtained from the patient, emergency department physician, and patient's significant other    History of Present Illness   Risa Mejía is a 89 year old female with a pertinent history of dementia who presents to this ED by EMS from [facility] for evaluation of AMS.  Per report, patient usually is ambulatory, confused at baseline but decreased responsiveness today.  Per nurse, large brown emesis and large bowel movement on arrival.         Past Medical History    No past medical history on file.  Htn, dementia  Past Surgical History   No past surgical history on file.    Prior to Admission Medications   Prior to Admission Medications   Prescriptions Last Dose Informant Patient Reported? Taking?   acetaminophen (TYLENOL) 500 MG tablet 12/26/2023 at 0800  Yes Yes   Sig: Take 1,000 mg by mouth 2 times daily   acetaminophen (TYLENOL) 500 MG tablet   Yes Yes   Sig: Take 1,000 mg by mouth daily as needed for mild pain   aspirin (ASA) 81 MG chewable tablet 12/26/2023 at 0800  Yes Yes   Sig: Take 81 mg by mouth daily   divalproex sodium delayed-release (DEPAKOTE SPRINKLE) 125 MG DR capsule 12/26/2023 at 0800  Yes Yes   Sig: Take 125 mg by mouth every morning   divalproex sodium delayed-release (DEPAKOTE SPRINKLE) 125 MG DR capsule 12/25/2023 at 2000  Yes Yes   Sig: Take 250 mg by mouth every evening   docusate sodium (COLACE) 100 MG capsule   Yes Yes   Sig: Take 100 mg by mouth 2 times daily as needed for constipation   lisinopril (ZESTRIL) 10 MG tablet 12/26/2023 at 0800  Yes Yes   Sig: Take 10 mg by mouth 2 times daily   mirtazapine (REMERON) 15 MG tablet 12/25/2023 at 2000  Yes Yes   Sig: Take 15 mg by mouth at bedtime   polyethylene glycol (MIRALAX) 17 g packet 12/26/2023 at 0800  Yes  Yes   Sig: Take 1 packet by mouth daily      Facility-Administered Medications: None        Allergies   Allergies   Allergen Reactions    Oxycodone Muscle Pain (Myalgia)        Physical Exam   Vital Signs: Temp: 97.5  F (36.4  C) Temp src: Rectal BP: 127/66 Pulse: 73   Resp: 21 SpO2: 95 % O2 Device: Nasal cannula Oxygen Delivery: 2 LPM  Weight: 0 lbs 0 oz    General.  Sleepy, demented not in acute distress. Frail elderly  HEENT.   anicteric,   Neck  no JVD.  CVS regular rhythm no murmur gallops.  Lungs.  Clear to auscultation bilateral no wheezing or rales.  Abdomen.  Soft nontender bowel sounds present.  Extremities.  No edema   Neurological.  Grossly intact  Skin no rash. No pallor.  Psych. Impaired memory and cognition     Medical Decision Making       65 MINUTES SPENT BY ME on the date of service doing chart review, history, exam, documentation & further activities per the note.      Data     I have personally reviewed the following data over the past 24 hrs:    7.9  \   12.2   / 242     142 105 26.5 (H) /  166 (H)   3.9 23 0.73 \     ALT: 21 AST: 26 AP: 87 TBILI: 0.3   ALB: 3.8 TOT PROTEIN: 6.3 (L) LIPASE: N/A     Procal: 0.12 CRP: N/A Lactic Acid: 1.6         Imaging results reviewed over the past 24 hrs:   Recent Results (from the past 24 hour(s))   Head CT w/o contrast    Narrative    EXAM: CT HEAD W/O CONTRAST  LOCATION: [facility]  DATE: 12/26/2023    INDICATION: AMS  COMPARISON: 09/21/2023.  TECHNIQUE: Routine CT Head without IV contrast. Multiplanar reformats. Dose reduction techniques were used.    FINDINGS:  INTRACRANIAL CONTENTS: Mild to moderate global cortical volume loss with expected dilatation of the ventricular system. Mild to moderate chronic small vessel ischemic changes. Intracranial atheromatous disease. No acute loss of gray-white   differentiation.    VISUALIZED ORBITS/SINUSES/MASTOIDS: No intraorbital abnormality. No paranasal sinus mucosal disease. No middle ear  or mastoid effusion.    BONES/SOFT TISSUES: No acute abnormality.      Impression    IMPRESSION:  1.  No acute findings. Chronic changes are stable.   CT Chest/Abdomen/Pelvis w Contrast    Narrative    EXAM: CT CHEST/ABDOMEN/PELVIS W CONTRAST  LOCATION: [facility]  DATE: 12/26/2023    INDICATION: AMS,vomiting, diarrhea  COMPARISON: None.  TECHNIQUE: CT scan of the chest, abdomen, and pelvis was performed following injection of IV contrast. Multiplanar reformats were obtained. Dose reduction techniques were used.   CONTRAST: IsoVue 370 75mL    FINDINGS:   LUNGS AND PLEURA: Mild tracheal bronchial secretions. Bilateral lower lobar predominant bronchial wall thickening with mild endobronchial mucoid impaction. Bibasilar atelectasis/scarring. Small pulmonary nodules including a dominant 5 mm right lower lobe   nodule image 190 series 5.    MEDIASTINUM/AXILLAE: No thoracic adenopathy. Mildly distended fluid-filled mid to lower thoracic esophagus with moderate circumferential wall thickening. Normal heart size and no pericardial effusion. Mitral annulus calcifications.    CORONARY ARTERY CALCIFICATION: Moderate.    HEPATOBILIARY: Cholecystectomy with mild intra and extrahepatic biliary ductal dilatation. No fluid or mass lesion or radiodense stone.    PANCREAS: Normal.    SPLEEN: Normal.    ADRENAL GLANDS: Normal.    KIDNEYS/BLADDER: Normal.    BOWEL: Unremarkable stomach. Normal caliber small bowel and colon. No free air or free fluid.    LYMPH NODES: Normal.    VASCULATURE: Mild to moderate aortoiliac atherosclerosis.    PELVIC ORGANS: Prominent uterine vascular calcifications.    MUSCULOSKELETAL: Right total hip arthroplasty and left proximal femur orthopedic fixation hardware. Spinal and pelvic degenerative changes. Osseous demineralization. No definite suspicious osseous lesion.      Impression    IMPRESSION:  1.  Bilateral lower lobar predominant bronchiolitis. Associated endobronchial  debris may reflect inflammatory debris or aspiration. No focal pneumonic consolidation or pleural effusion.  2.  Mildly dilated fluid-filled lower thoracic esophagus with long segment circumferential wall thickening which is favored to reflect severe esophagitis rather than a neoplastic process. If the patient is not a candidate for endoscopy, recommend at   least short interval chest CT and/or esophagram follow-up.  3.  No acute findings the abdomen or pelvis. No inflammatory process, bowel obstruction, hydronephrosis or abscess.

## 2023-12-27 NOTE — PROGRESS NOTES
[facility]    Medicine Progress Note - Hospitalist Service    Date of Admission:  12/26/2023    Assessment & Plan      Risa Mejía is a 89 year old female admitted on 12/26/2023. She has advanced dementia, HTN, living in memory care. Presenting with decreased responsiveness. Vomited en route.    Abnormal UA, possible UTI/acute cystitis  -early sepsis with hypothermia. Now temp wnl.   -iv Rocephin  -ivf  -await uc: so far no growth  -discussed with son: if UC no growth tomorrow, might discontinue atibiotics since no leukocytosis and fever    Vomiting  Esophagitis  -abd ct: possible esophagitis  -ppi  -Zofran prn    Acute metabolic encephalopathy in addition to dementis  -due to UTI?  -close monitor; Zyprexa prn agitation     Advanced dementia  -called  and son, who cannot communicate much with pt at [facility]  -PTA meds    HTN  -PTA meds    Pt is on Depakote, but family don't know what is for   -resume home PTA meds and try to get record from pcp       Observation Goals: -diagnostic tests and consults completed and resulted, -vital signs normal or at patient baseline, -tolerating oral intake to maintain hydration, -tolerating oral antibiotics or has plans for home infusion setup, -infection is improving, -returns to baseline functional status, -safe disposition plan has been identified, Nurse to notify provider when observation goals have been met and patient is ready for discharge.  Diet: Combination Diet Full Liquid    DVT Prophylaxis: Pneumatic Compression Devices  Santana Catheter: Not present  Lines: None     Cardiac Monitoring: None  Code Status: Full Code      Clinically Significant Risk Factors Present on Admission                # Drug Induced Platelet Defect: home medication list includes an antiplatelet medication   # Hypertension: Home medication list includes antihypertensive(s)   # Dementia: noted on problem list               Disposition Plan      Expected Discharge  Date: 12/27/2023      Destination: assisted living              Camron Dubon MD  Hospitalist Service  [facility]  Securely message with [employer] (more info)  Text page via [employer] Paging/Directory   ______________________________________________________________________    Interval History   Most of the time pt sleeping, when woke up, pt refused care/meds, agitated    Physical Exam   Vital Signs: Temp: 98.1  F (36.7  C) Temp src: Axillary BP: (!) 157/67 Pulse: 68   Resp: 12 SpO2: 100 % O2 Device: Nasal cannula Oxygen Delivery: 1 LPM  Weight: 0 lbs 0 oz    General.  sleepy not in acute distress. Dementia  HEENT.   anicteric,  Neck no JVD.  CVS regular rhythm no murmur gallops.  Lungs.  Clear to auscultation bilateral no wheezing or rales.  Abdomen.  Soft bowel sounds present.  Extremities.  No edema   Neurological. Grossly intact  Skin no rash. No pallor.  Psych. Impaired memory/cognition     Medical Decision Making       46 MINUTES SPENT BY ME on the date of service doing chart review, history, exam, documentation & further activities per the note.      Data     I have personally reviewed the following data over the past 24 hrs:    7.9  \   12.2   / 242     142 105 26.5 (H) /  104 (H)   3.9 23 0.73 \     ALT: 21 AST: 26 AP: 87 TBILI: 0.3   ALB: 3.8 TOT PROTEIN: 6.3 (L) LIPASE: N/A     TSH: 5.58 (H) T4: 1.16 A1C: 5.4     Procal: 0.12 CRP: N/A Lactic Acid: 1.6         Imaging results reviewed over the past 24 hrs:   Recent Results (from the past 24 hour(s))   Head CT w/o contrast    Narrative    EXAM: CT HEAD W/O CONTRAST  LOCATION: [facility]  DATE: 12/26/2023    INDICATION: AMS  COMPARISON: 09/21/2023.  TECHNIQUE: Routine CT Head without IV contrast. Multiplanar reformats. Dose reduction techniques were used.    FINDINGS:  INTRACRANIAL CONTENTS: Mild to moderate global cortical volume loss with expected dilatation of the ventricular system. Mild to moderate chronic  small vessel ischemic changes. Intracranial atheromatous disease. No acute loss of gray-white   differentiation.    VISUALIZED ORBITS/SINUSES/MASTOIDS: No intraorbital abnormality. No paranasal sinus mucosal disease. No middle ear or mastoid effusion.    BONES/SOFT TISSUES: No acute abnormality.      Impression    IMPRESSION:  1.  No acute findings. Chronic changes are stable.   CT Chest/Abdomen/Pelvis w Contrast    Narrative    EXAM: CT CHEST/ABDOMEN/PELVIS W CONTRAST  LOCATION: [facility]  DATE: 12/26/2023    INDICATION: AMS,vomiting, diarrhea  COMPARISON: None.  TECHNIQUE: CT scan of the chest, abdomen, and pelvis was performed following injection of IV contrast. Multiplanar reformats were obtained. Dose reduction techniques were used.   CONTRAST: IsoVue 370 75mL    FINDINGS:   LUNGS AND PLEURA: Mild tracheal bronchial secretions. Bilateral lower lobar predominant bronchial wall thickening with mild endobronchial mucoid impaction. Bibasilar atelectasis/scarring. Small pulmonary nodules including a dominant 5 mm right lower lobe   nodule image 190 series 5.    MEDIASTINUM/AXILLAE: No thoracic adenopathy. Mildly distended fluid-filled mid to lower thoracic esophagus with moderate circumferential wall thickening. Normal heart size and no pericardial effusion. Mitral annulus calcifications.    CORONARY ARTERY CALCIFICATION: Moderate.    HEPATOBILIARY: Cholecystectomy with mild intra and extrahepatic biliary ductal dilatation. No fluid or mass lesion or radiodense stone.    PANCREAS: Normal.    SPLEEN: Normal.    ADRENAL GLANDS: Normal.    KIDNEYS/BLADDER: Normal.    BOWEL: Unremarkable stomach. Normal caliber small bowel and colon. No free air or free fluid.    LYMPH NODES: Normal.    VASCULATURE: Mild to moderate aortoiliac atherosclerosis.    PELVIC ORGANS: Prominent uterine vascular calcifications.    MUSCULOSKELETAL: Right total hip arthroplasty and left proximal femur orthopedic fixation  hardware. Spinal and pelvic degenerative changes. Osseous demineralization. No definite suspicious osseous lesion.      Impression    IMPRESSION:  1.  Bilateral lower lobar predominant bronchiolitis. Associated endobronchial debris may reflect inflammatory debris or aspiration. No focal pneumonic consolidation or pleural effusion.  2.  Mildly dilated fluid-filled lower thoracic esophagus with long segment circumferential wall thickening which is favored to reflect severe esophagitis rather than a neoplastic process. If the patient is not a candidate for endoscopy, recommend at   least short interval chest CT and/or esophagram follow-up.  3.  No acute findings the abdomen or pelvis. No inflammatory process, bowel obstruction, hydronephrosis or abscess.

## 2023-12-27 NOTE — UTILIZATION REVIEW
Concurrent stay review; Secondary Review Determination     Under the authority of the Utilization Management Committee, the utilization review process indicated a secondary review on Risa Mejía.  The review outcome is based on review of the medical records, discussions with staff, and applying clinical experience noted on the date of the review.        (x) Observation Status Appropriate - Concurrent stay review    RATIONALE FOR DETERMINATION   Risa Mejía is a 89 yr old female who has dementia and resides at long term memory care facility.  She was brought in for decreased responsiveness.  She had lower temp but no fever, no WBC.  UA abnormal and on IV abx.  No growth at this time.  Intermittent emesis.  Lethargic in AM but up and taking pills later in morning.  Currently urine culture negative to date and attending considering discontinuing antibiotics.  Possible progressive dementia.  No behavior meds needed at this time.      Patient is clinically improving and there is no clear indication to change patient's status to inpatient. The severity of illness, intensity of service provided, expected LOS and risk for adverse outcome make the care appropriate for observation.    The information on this document is developed by the utilization review team in order for the business office to ensure compliance.  This only denotes the appropriateness of proper admission status and does not reflect the quality of care rendered.         The definitions of Inpatient Status and Observation Status used in making the determination above are those provided in the CMS Coverage Manual, Chapter 1 and Chapter 6, section 70.4.      Sincerely,   Suzanne Ruggiero MD  Utilization Review  Physician Advisor  [facility]

## 2023-12-27 NOTE — PLAN OF CARE
PRIMARY DIAGNOSIS: GENERALIZED WEAKNESS    OUTPATIENT/OBSERVATION GOALS TO BE MET BEFORE DISCHARGE  1. Orthostatic performed: No    2. Tolerating PO medications: Yes    3. Return to near baseline physical activity: Yes    4. Cleared for discharge by consultants (if involved): No    Discharge Planner Nurse   Safe discharge environment identified: No  Barriers to discharge: Yes       Entered by: Jose Luis Cuevas RN 12/27/2023 10:36 AM     Please review provider order for any additional goals.   Nurse to notify provider when observation goals have been met and patient is ready for discharge.Goal Outcome Evaluation:

## 2023-12-27 NOTE — PLAN OF CARE
Goal Outcome Evaluation:    Pt oriented to self, occasionally following. Illogical sentences and words. Q2H repo, incontinent of urine and 1 BM.   NSR on tele, 1 L nc, VSS.   Unable to take PO medications due to pt lethargic initially, pt eventually more alert but refusing and combative when attempting meds crushed with pudding.   At times attempting to hit and bite occasionally with cares, other times cooperative.     Problem: Adult Inpatient Plan of Care  Goal: Plan of Care Review  Outcome: Progressing  Goal: Absence of Hospital-Acquired Illness or Injury  Outcome: Progressing  Intervention: Identify and Manage Fall Risk  Recent Flowsheet Documentation  Taken 12/27/2023 0400 by Rebekah Ramirez, RN  Safety Promotion/Fall Prevention:   room door open   increased rounding and observation  Intervention: Prevent Skin Injury  Recent Flowsheet Documentation  Taken 12/27/2023 0400 by Rebekah Ramirez, RN  Body Position:   turned   left  Intervention: Prevent Infection  Recent Flowsheet Documentation  Taken 12/27/2023 0400 by Rebekah Ramirez, RN  Goal: Optimal Comfort and Wellbeing  Outcome: Progressing  Goal: Readiness for Transition of Care  Outcome: Progressing    12/27/23

## 2023-12-27 NOTE — PROGRESS NOTES
12/27/23 0900   Appointment Info   Signing Clinician's Name / Credentials (PT) Ramona Haji PT   Quick Adds   Quick Adds Certification   Living Environment   People in Home facility resident   Current Living Arrangements extended care facility  (memory care unit)   Home Accessibility no concerns   Transportation Anticipated agency;family or friend will provide   Living Environment Comments Pt was not able to provide any home hx of function.  Pt is confused.   Self-Care   Current Activity Tolerance fair   Equipment Currently Used at Home walker, rolling;wheelchair, manual   Activity/Exercise/Self-Care Comment Pt needs total assist with cares and per the chart, pt uses a walker but is using the WC more and more.  Pt was not able to provide home hx.   General Information   Onset of Illness/Injury or Date of Surgery 12/26/23   Referring Physician Camron Mcgregor   Patient/Family Therapy Goals Statement (PT) none stated.   Pertinent History of Current Problem (include personal factors and/or comorbidities that impact the POC) Per the chart, Risa Mejía is a 89 year old female admitted on 12/26/2023. She has advanced dementia, HTN, living in memory care. Presenting with decreased responsiveness. Vomited en route.     Abnormal UA, possible UTI/acute cystitis  -early sepsis with hypothermia   Existing Precautions/Restrictions fall   Weight-Bearing Status - LLE weight-bearing as tolerated   Weight-Bearing Status - RLE weight-bearing as tolerated   Cognition   Affect/Mental Status (Cognition) confused   Orientation Status (Cognition) oriented to;person   Range of Motion (ROM)   ROM Comment Pt does have increased pain in the L LE.  It looks like the pt has kept her L hip and knee flexed and was painful with PT moving it   Strength (Manual Muscle Testing)   Strength Comments Pt did WB with standing with max Ax 2. Pt did not follow commands with MMT.   Bed Mobility   Comment, (Bed Mobility) Supine<>sit with max A x  2.  (max cues and assist.)   Transfers   Comment, (Transfers) Sit<>stand xwith max Ax 2 x 2 reps with HHA xx2.  Poor stand bal and posture.   Gait/Stairs (Locomotion)   Comment, (Gait/Stairs) gait not brandon due to poor stand bal.   Balance   Balance Comments Poor stand bal and min A with static sit bal.   Sensory Examination   Sensory Perception WNL   Sensory Perception Comments Pt could feel light touch bilat LEs   Clinical Impression   Criteria for Skilled Therapeutic Intervention Yes, treatment indicated   PT Diagnosis (PT) Impaired functional mobility.   Influenced by the following impairments weakness, dec bal, confusion.   Functional limitations due to impairments bed mobility, transfers.   Clinical Presentation (PT Evaluation Complexity) evolving   Clinical Presentation Rationale Pt presents medically diagnosed.   Clinical Decision Making (Complexity) moderate complexity   Planned Therapy Interventions (PT) bed mobility training;ROM (range of motion);strengthening;transfer training   Risk & Benefits of therapy have been explained evaluation/treatment results reviewed;care plan/treatment goals reviewed;risks/benefits reviewed  (Pt is confused.)   PT Total Evaluation Time   PT Eval, Moderate Complexity Minutes (26519) 15   Therapy Certification   Start of care date 12/27/23   Certification date from 12/27/23   Certification date to 01/03/24   Medical Diagnosis UTI, dementia with agitation   Physical Therapy Goals   PT Frequency 3x/week   PT Predicted Duration/Target Date for Goal Attainment 01/03/24   PT Goals Bed Mobility;Transfers   PT: Bed Mobility Moderate assist;Supine to/from sit;Rolling   PT: Transfers Moderate assist;Bed to/from chair;Sit to/from stand  (with HHA x 2.)   Interventions   Interventions Quick Adds Therapeutic Procedure   Therapeutic Procedure/Exercise   Ther. Procedure: strength, endurance, ROM, flexibillity Minutes (06868) 8   Treatment Detail/Skilled Intervention PROM bilat LEs.  Pt did  have stiffness on the L LE and she did not get the knee extended flat.  Possible contraction .  Bilat seated LAQ, bilat HF, and AP x 10 reps with max A.   PT Discharge Planning   PT Discharge Recommendation (DC Rec) Long term care facility  (back to memory care)   PT Rationale for DC Rec Pt does need A x 2 with bed mobility and will need a lift for transfers. Pt needed max A x 2 with sit <>stand with HHA x 2  (24/7 hour supervision is recommended and assist with all mobility.)   PT Brief overview of current status PT eval, supine<>sit with max Ax 2, Sit<>stand with max A x 2 x 2 reps with HHA x 2.  LE ROM and some AAROM bilat LEs   PT Equipment Needed at Discharge wheelchair;wheelchair cushion  (kalr lift.)   Total Session Time   Timed Code Treatment Minutes 8   Total Session Time (sum of timed and untimed services) 23     [facility]  OUTPATIENT PHYSICAL THERAPY EVALUATION  PLAN OF TREATMENT FOR OUTPATIENT REHABILITATION  (COMPLETE FOR INITIAL CLAIMS ONLY)  Patient's Last Name, First Name, M.I.  YOB: 1933  Risa Mejía                        Provider's Name  [facility] Medical Record No.  [id number]                             Onset Date:  12/26/23   Start of Care Date:  12/27/23   Type:     _X_PT   ___OT   ___SLP Medical Diagnosis:  UTI, dementia with agitation              PT Diagnosis:  Impaired functional mobility. Visits from SOC:  1     See note for plan of treatment, functional goals and certification details    I CERTIFY THE NEED FOR THESE SERVICES FURNISHED UNDER        THIS PLAN OF TREATMENT AND WHILE UNDER MY CARE     (Physician co-signature of this document indicates review and certification of the therapy plan).

## 2023-12-27 NOTE — CONSULTS
Care Management Initial Consult    General Information  Assessment completed with: Spouse or significant other, spouse Evgeny  Type of CM/SW Visit: Initial Assessment    Primary Care Provider verified and updated as needed: Yes   Readmission within the last 30 days: no previous admission in last 30 days      Reason for Consult: discharge planning  Advance Care Planning: Advance Care Planning Reviewed: other (see comments) (no HCD at this time. Spouse will be discussing with sons and will update on the decisions.)          Communication Assessment  Patient's communication style: spoken language (English or Bilingual)             Cognitive  Cognitive/Neuro/Behavioral: orientation  Level of Consciousness: confused     Orientation:  (unable to assess)             Living Environment:   People in home: facility resident     Current living Arrangements: assisted living (memory care unit)  Name of Facility: [facility]   Able to return to prior arrangements: yes       Family/Social Support:  Care provided by: other (see comments) (facility staff)  Provides care for: no one, unable/limited ability to care for self  Marital Status:   , Children, Facility resident(s)/Staff  Evgeny       Description of Support System: Supportive, Involved    Support Assessment: Adequate family and caregiver support    Current Resources:   Patient receiving home care services: No     Community Resources: Skilled Nursing Facility  Equipment currently used at home:    Supplies currently used at home: Incontinence Supplies    Employment/Financial:  Employment Status:          Financial Concerns:             Does the patient's insurance plan have a 3 day qualifying hospital stay waiver?  No    Lifestyle & Psychosocial Needs:  Social Determinants of Health     Food Insecurity: Not on file   Depression: Not on file   Housing Stability: Not on file   Tobacco Use: Not on file   Financial Resource Strain: Not on file   Alcohol  Use: Not on file   Transportation Needs: Not on file   Physical Activity: Not on file   Interpersonal Safety: Not on file   Stress: Not on file   Social Connections: Not on file       Functional Status:  Prior to admission patient needed assistance:   Dependent ADLs:: Ambulation-walker, Wheelchair-with assist, Bathing, Dressing, Grooming, Incontinence, Transfers  Dependent IADLs:: Cleaning, Cooking, Laundry, Shopping, Meal Preparation, Medication Management, Money Management, Transportation, Incontinence  Assesssment of Functional Status: Not at  functional baseline    Mental Health Status:          Chemical Dependency Status:                Values/Beliefs:  Spiritual, Cultural Beliefs, [employer] Practices, Values that affect care:                 Additional Information:  Spoke with spouse on the phone for initial assessment. Introduced self and care management role. Patient is from [facility] memory care unit. Spouse lives in same facility in [facility]. She is total cares. Spouse reports that she usually ambulates using a walker but is needing w/c transfer more and more. BETANCOURT discussed. PT/OT eval pending. Goal is back to facility per spouse. Transportation TBD    Jennifer Daiz RN

## 2023-12-27 NOTE — PLAN OF CARE
Occupational Therapy Discharge Summary    Reason for therapy discharge:    No further expectations of functional progress.    Progress towards therapy goal(s). See goals on Care Plan in [facility] electronic health record for goal details.  No skilled OT needs    Therapy recommendation(s):    No further therapy is recommended.     FELIPE De Jesus, OTR/L, 12/27/2023, 10:11 AM

## 2023-12-27 NOTE — PLAN OF CARE
PRIMARY DIAGNOSIS: GENERALIZED WEAKNESS    OUTPATIENT/OBSERVATION GOALS TO BE MET BEFORE DISCHARGE  1. Orthostatic performed: No    2. Tolerating PO medications: Yes    3. Return to near baseline physical activity: No     4. Cleared for discharge by consultants (if involved): No    Discharge Planner Nurse   Safe discharge environment identified: No  Barriers to discharge: Yes       Entered by: Jose Lius Cuevas RN 12/27/2023 5:25 PM     Please review provider order for any additional goals.   Nurse to notify provider when observation goals have been met and patient is ready for discharge.Goal Outcome Evaluation:    Pt alert and oriented to self. Pulled out PIV x1. VSS. Tele NSR. Calm and no sign of pain. Needs assistance with feeding. Incontinent of B&B. Had medium BM x1. Was incontinent of urine x3. Bladder scan 229 ml in AM and 401 ml this afternoon. Straight cath for 350 ml. Assist of two person with ADLs.

## 2023-12-28 NOTE — PLAN OF CARE
"PRIMARY DIAGNOSIS: \"GENERIC\" NURSING  OUTPATIENT/OBSERVATION GOALS TO BE MET BEFORE DISCHARGE:  ADLs back to baseline: Yes    Activity and level of assistance: In bed    Pain status: Pain free.    Return to near baseline physical activity: Yes     Discharge Planner Nurse   Safe discharge environment identified: No  Barriers to discharge: Yes       Entered by: Zane Rutledge RN 12/28/2023 12:36 AM     Please review provider order for any additional goals.   Nurse to notify provider when observation goals have been met and patient is ready for discharge.Goal Outcome Evaluation:                        "

## 2023-12-28 NOTE — DISCHARGE SUMMARY
[facility]    Discharge Summary  Hospitalist    Date of Admission:  12/26/2023  Date of Discharge:  12/28/2023  Discharging Provider: Camron Dubon MD  Date of Service (when I saw the patient): 12/28/23    Discharge Diagnoses   Metabolic encephalopathy in addition to dementia  Abnormal urinalysis with negative urine culture  Esophagitis    History of Present Illness   Risa Mejía is an 89 year old female who presented with decreased level of responsiveness and vomiting    Hospital Course   Risa Mejía is a 89 year old female admitted on 12/26/2023. She has advanced dementia, HTN, living in memory care. Presenting with decreased responsiveness. Vomited en route.     Abnormal UA, possible UTI/acute cystitis  -early sepsis with hypothermia. Now temp wnl.   -iv Rocephin in hospital  -ivf  -await uc: so far no growth; no fever or leukocytopsis. Stop antibiotics.   -discussed with son and spouse. Discharge back to memory care without antibiotics     Vomiting  Esophagitis  -abd ct: possible esophagitis  -ppi otc  -Zofran prn     Acute metabolic encephalopathy in addition to dementis  -due to above  -close monitor; Zyprexa prn agitation   -as per family, pt back to baseline. Patient can hardly communicate at baseline     Advanced dementia  -called  and son, who cannot communicate much with pt at [place]  -PTA meds     HTN  -PTA meds       Camron Dubon MD    Significant Results and Procedures   See below    Pending Results   These results will be followed up by pcp  Unresulted Labs Ordered in the Past 30 Days of this Admission       Date and Time Order Name Status Description    12/26/2023  2:59 PM Valproic Acid Free & Total In process     12/26/2023 12:14 PM Blood Culture Peripheral Blood Preliminary     12/26/2023 12:14 PM Blood Culture Arm, Right Preliminary             Code Status   Full Code   Confirmed with     Primary Care Physician   Raghu  Harold    General.  Advanced dementia not in acute distress.  HEENT.   anicteric, EOM intact.  Neck supple no JVD.  CVS regular rhythm no murmur gallops.  Lungs.  Clear to auscultation bilateral no wheezing or rales.  Abdomen.  Soft nontender bowel sounds present.  Extremities.  No edema no calf tenderness.  Neurological.  No focal deficit.  Skin no rash. No pallor.  Psych. Impaired cognition    Discharge Disposition   Discharged to memory care unit  Condition at discharge: Fair    Consultations This Hospital Stay   PHYSICAL THERAPY ADULT IP CONSULT  OCCUPATIONAL THERAPY ADULT IP CONSULT  CARE MANAGEMENT / SOCIAL WORK IP CONSULT  PHARMACY IP CONSULT    Time Spent on this Encounter   I, Camron Dubon MD, personally saw the patient today and spent greater than 30 minutes discharging this patient.    Discharge Orders   No discharge procedures on file.  Discharge Medications   Current Discharge Medication List        CONTINUE these medications which have NOT CHANGED    Details   !! acetaminophen (TYLENOL) 500 MG tablet Take 1,000 mg by mouth 2 times daily      !! acetaminophen (TYLENOL) 500 MG tablet Take 1,000 mg by mouth daily as needed for mild pain      aspirin (ASA) 81 MG chewable tablet Take 81 mg by mouth daily      !! divalproex sodium delayed-release (DEPAKOTE SPRINKLE) 125 MG DR capsule Take 125 mg by mouth every morning      !! divalproex sodium delayed-release (DEPAKOTE SPRINKLE) 125 MG DR capsule Take 250 mg by mouth every evening      docusate sodium (COLACE) 100 MG capsule Take 100 mg by mouth 2 times daily as needed for constipation      lisinopril (ZESTRIL) 10 MG tablet Take 10 mg by mouth 2 times daily      mirtazapine (REMERON) 15 MG tablet Take 15 mg by mouth at bedtime      polyethylene glycol (MIRALAX) 17 g packet Take 1 packet by mouth daily       !! - Potential duplicate medications found. Please discuss with provider.        Allergies   Allergies   Allergen Reactions    Oxycodone Muscle Pain  (Myalgia)     Data   Most Recent 3 CBC's:  Recent Labs   Lab Test 12/26/23  1217   WBC 7.9   HGB 12.2   MCV 98         Most Recent 3 BMP's:  Recent Labs   Lab Test 12/27/23  0747 12/26/23  1754 12/26/23  1215 12/26/23  1137 11/09/23  0952 11/10/22  1105   NA  --   --  142  --  142 145   POTASSIUM  --   --  3.9  --  3.9 5.0   CHLORIDE  --   --  105  --  106 108*   CO2  --   --  23  --  26 24   BUN  --   --  26.5*  --  10.4 26.6*   CR  --   --  0.73  --  0.64 0.74   ANIONGAP  --   --  14  --  10 13   SOL  --   --  9.2  --  9.1 9.8   * 115* 166*   < > 110* 84    < > = values in this interval not displayed.     Most Recent 2 LFT's:  Recent Labs   Lab Test 12/26/23  1215 02/09/23  0853   AST 26 26   ALT 21 9*   ALKPHOS 87 73   BILITOTAL 0.3 0.3     Most Recent INR's and Anticoagulation Dosing History:  Anticoagulation Dose History           No data to display              Most Recent 3 Troponin's:No lab results found.  Most Recent Cholesterol Panel:No lab results found.  Most Recent 6 Bacteria Isolates From Any Culture (See EPIC Reports for Culture Details):No lab results found.  Most Recent TSH, T4 and A1c Labs:  Recent Labs   Lab Test 12/26/23  1217 12/26/23  1215   TSH  --  5.58*   T4  --  1.16   A1C 5.4  --      Results for orders placed or performed during the hospital encounter of 12/26/23   Head CT w/o contrast    Narrative    EXAM: CT HEAD W/O CONTRAST  LOCATION: [facility]  DATE: 12/26/2023    INDICATION: AMS  COMPARISON: 09/21/2023.  TECHNIQUE: Routine CT Head without IV contrast. Multiplanar reformats. Dose reduction techniques were used.    FINDINGS:  INTRACRANIAL CONTENTS: Mild to moderate global cortical volume loss with expected dilatation of the ventricular system. Mild to moderate chronic small vessel ischemic changes. Intracranial atheromatous disease. No acute loss of gray-white   differentiation.    VISUALIZED ORBITS/SINUSES/MASTOIDS: No intraorbital abnormality.  No paranasal sinus mucosal disease. No middle ear or mastoid effusion.    BONES/SOFT TISSUES: No acute abnormality.      Impression    IMPRESSION:  1.  No acute findings. Chronic changes are stable.   CT Chest/Abdomen/Pelvis w Contrast    Narrative    EXAM: CT CHEST/ABDOMEN/PELVIS W CONTRAST  LOCATION: [facility]  DATE: 12/26/2023    INDICATION: AMS,vomiting, diarrhea  COMPARISON: None.  TECHNIQUE: CT scan of the chest, abdomen, and pelvis was performed following injection of IV contrast. Multiplanar reformats were obtained. Dose reduction techniques were used.   CONTRAST: IsoVue 370 75mL    FINDINGS:   LUNGS AND PLEURA: Mild tracheal bronchial secretions. Bilateral lower lobar predominant bronchial wall thickening with mild endobronchial mucoid impaction. Bibasilar atelectasis/scarring. Small pulmonary nodules including a dominant 5 mm right lower lobe   nodule image 190 series 5.    MEDIASTINUM/AXILLAE: No thoracic adenopathy. Mildly distended fluid-filled mid to lower thoracic esophagus with moderate circumferential wall thickening. Normal heart size and no pericardial effusion. Mitral annulus calcifications.    CORONARY ARTERY CALCIFICATION: Moderate.    HEPATOBILIARY: Cholecystectomy with mild intra and extrahepatic biliary ductal dilatation. No fluid or mass lesion or radiodense stone.    PANCREAS: Normal.    SPLEEN: Normal.    ADRENAL GLANDS: Normal.    KIDNEYS/BLADDER: Normal.    BOWEL: Unremarkable stomach. Normal caliber small bowel and colon. No free air or free fluid.    LYMPH NODES: Normal.    VASCULATURE: Mild to moderate aortoiliac atherosclerosis.    PELVIC ORGANS: Prominent uterine vascular calcifications.    MUSCULOSKELETAL: Right total hip arthroplasty and left proximal femur orthopedic fixation hardware. Spinal and pelvic degenerative changes. Osseous demineralization. No definite suspicious osseous lesion.      Impression    IMPRESSION:  1.  Bilateral lower lobar  predominant bronchiolitis. Associated endobronchial debris may reflect inflammatory debris or aspiration. No focal pneumonic consolidation or pleural effusion.  2.  Mildly dilated fluid-filled lower thoracic esophagus with long segment circumferential wall thickening which is favored to reflect severe esophagitis rather than a neoplastic process. If the patient is not a candidate for endoscopy, recommend at   least short interval chest CT and/or esophagram follow-up.  3.  No acute findings the abdomen or pelvis. No inflammatory process, bowel obstruction, hydronephrosis or abscess.

## 2023-12-28 NOTE — CARE PLAN
"PRIMARY DIAGNOSIS: \"GENERIC\" NURSING  OUTPATIENT/OBSERVATION GOALS TO BE MET BEFORE DISCHARGE:  ADLs back to baseline: Yes    Activity and level of assistance:     Pain status: Pain free.    Return to near baseline physical activity: Yes     Discharge Planner Nurse   Safe discharge environment identified: No  Barriers to discharge: Yes       Entered by: Zane Rutledge RN 12/28/2023 5:00 AM     Please review provider order for any additional goals.   Nurse to notify provider when observation goals have been met and patient is ready for discharge.      Alert and oriented to self only. NSR on tele. VSS on RA. Calm with no signs of agitation noted. 0100 Bladder scan 230 and then after 2 hours 475. Straight cath done and obtained 425 ml of urine.  " Abdominal Pain, N/V/D

## 2023-12-28 NOTE — PLAN OF CARE
Goal Outcome Evaluation:       Patient discharged back to her memory care. Patient is oriented to self only.She left via stretcher through [facility] transport. Patient did not want to get dressed into the clothes that were found in her belongings back.belongings back sent with transport personal.

## 2023-12-28 NOTE — PROGRESS NOTES
"PRIMARY DIAGNOSIS: \"GENERIC\" NURSING  OUTPATIENT/OBSERVATION GOALS TO BE MET BEFORE DISCHARGE:  ADLs back to baseline: Yes    Activity and level of assistance: wheelchair, dependent    Pain status: Pain free.    Return to near baseline physical activity: Yes     Discharge Planner Nurse   Safe discharge environment identified: Yes  Barriers to discharge: No       Entered by: Treasure Power RN 12/28/2023 12:15 PM     Please review provider order for any additional goals.   Nurse to notify provider when observation goals have been met and patient is ready for discharge.                        "

## 2023-12-28 NOTE — PROGRESS NOTES
Care Management Discharge Note    Discharge Date: 12/28/2023       Discharge Disposition:  return to Memory Care    Discharge Services:      Discharge DME:      Discharge Transportation: agency,   Private pay costs discussed: transportation costs    Does the patient's insurance plan have a 3 day qualifying hospital stay waiver?  No    PAS Confirmation Code:  NA  Patient/family educated on Medicare website which has current facility and service quality ratings:  NA    Education Provided on the Discharge Plan:  yes  Persons Notified of Discharge Plans:  Evgeny and Julieta [facility]  Patient/Family in Agreement with the Plan:  yes    Handoff Referral Completed: Yes    Additional Information:   Evgeny authorized stretcher to return patient to  memory care Ride set [id number] . PCS done. [facility] updated.    IGOR Lopez

## 2023-12-28 NOTE — PLAN OF CARE
Physical Therapy Discharge Summary    Reason for therapy discharge:    Discharged to memory care unit    Progress towards therapy goal(s). See goals on Care Plan in [facility] electronic health record for goal details.  Goals not met.  Barriers to achieving goals:   discharge from facility.    Therapy recommendation(s):    Continued therapy is recommended.  Rationale/Recommendations:  to improve mobility and strength. Rec PT to screen for needs in her home setting. .

## 2023-12-28 NOTE — PLAN OF CARE
PRIMARY DIAGNOSIS: GENERALIZED WEAKNESS    OUTPATIENT/OBSERVATION GOALS TO BE MET BEFORE DISCHARGE  1. Orthostatic performed: N/A    2. Tolerating PO medications: Yes    3. Return to near baseline physical activity: Yes    4. Cleared for discharge by consultants (if involved): No    Discharge Planner Nurse   Safe discharge environment identified: No  Barriers to discharge: Yes       Entered by: Zane Rutledge RN 12/27/2023 11:22 PM     Please review provider order for any additional goals.   Nurse to notify provider when observation goals have been met and patient is ready for discharge.Goal Outcome Evaluation:       Admitted to room 110 from ED. Alert and oriented to self only. On full liquid diet. Able to take meds with no issue. VSS on RA. No signs of pain and agitation noted.

## 2024-01-01 ENCOUNTER — LAB REQUISITION (OUTPATIENT)
Dept: LAB | Facility: CLINIC | Age: 89
End: 2024-01-01
Payer: COMMERCIAL

## 2024-01-01 DIAGNOSIS — F02.80 DEMENTIA IN OTHER DISEASES CLASSIFIED ELSEWHERE, UNSPECIFIED SEVERITY, WITHOUT BEHAVIORAL DISTURBANCE, PSYCHOTIC DISTURBANCE, MOOD DISTURBANCE, AND ANXIETY (H): ICD-10-CM

## 2024-01-01 DIAGNOSIS — F41.8 OTHER SPECIFIED ANXIETY DISORDERS: ICD-10-CM

## 2024-01-01 LAB
ALBUMIN SERPL BCG-MCNC: 3.9 G/DL (ref 3.5–5.2)
ALP SERPL-CCNC: 90 U/L (ref 40–150)
ALT SERPL W P-5'-P-CCNC: 10 U/L (ref 0–50)
AST SERPL W P-5'-P-CCNC: 17 U/L (ref 0–45)
BILIRUB DIRECT SERPL-MCNC: <0.2 MG/DL (ref 0–0.3)
BILIRUB SERPL-MCNC: 0.2 MG/DL
PROT SERPL-MCNC: 6.2 G/DL (ref 6.4–8.3)
VALPROATE SERPL-MCNC: 23 UG/ML

## 2024-01-01 PROCEDURE — 36415 COLL VENOUS BLD VENIPUNCTURE: CPT | Mod: ORL | Performed by: PHYSICIAN ASSISTANT

## 2024-01-01 PROCEDURE — P9604 ONE-WAY ALLOW PRORATED TRIP: HCPCS | Mod: ORL | Performed by: PHYSICIAN ASSISTANT

## 2024-01-01 PROCEDURE — 80076 HEPATIC FUNCTION PANEL: CPT | Mod: ORL | Performed by: PHYSICIAN ASSISTANT

## 2024-01-01 PROCEDURE — 80164 ASSAY DIPROPYLACETIC ACD TOT: CPT | Mod: ORL | Performed by: PHYSICIAN ASSISTANT
